# Patient Record
Sex: MALE | Race: WHITE | NOT HISPANIC OR LATINO | Employment: OTHER | RURAL
[De-identification: names, ages, dates, MRNs, and addresses within clinical notes are randomized per-mention and may not be internally consistent; named-entity substitution may affect disease eponyms.]

---

## 2024-03-03 ENCOUNTER — HOSPITAL ENCOUNTER (EMERGENCY)
Facility: HOSPITAL | Age: 59
Discharge: HOME | End: 2024-03-03
Payer: COMMERCIAL

## 2024-03-03 VITALS
OXYGEN SATURATION: 96 % | RESPIRATION RATE: 18 BRPM | SYSTOLIC BLOOD PRESSURE: 156 MMHG | BODY MASS INDEX: 45.13 KG/M2 | HEART RATE: 53 BPM | TEMPERATURE: 98 F | DIASTOLIC BLOOD PRESSURE: 67 MMHG | HEIGHT: 68 IN | WEIGHT: 297.81 LBS

## 2024-03-03 DIAGNOSIS — G51.0 BELL'S PALSY: Primary | ICD-10-CM

## 2024-03-03 LAB — GLUCOSE SERPL-MCNC: 117 MG/DL (ref 70–105)

## 2024-03-03 PROCEDURE — 99283 EMERGENCY DEPT VISIT LOW MDM: CPT

## 2024-03-03 PROCEDURE — 82962 GLUCOSE BLOOD TEST: CPT

## 2024-03-03 PROCEDURE — 99283 EMERGENCY DEPT VISIT LOW MDM: CPT | Mod: ,,, | Performed by: STUDENT IN AN ORGANIZED HEALTH CARE EDUCATION/TRAINING PROGRAM

## 2024-03-03 RX ORDER — ALLOPURINOL 300 MG/1
300 TABLET ORAL
COMMUNITY
Start: 2023-12-30

## 2024-03-03 RX ORDER — ASPIRIN 81 MG/1
81 TABLET ORAL DAILY
Status: ON HOLD | COMMUNITY
End: 2024-05-29 | Stop reason: HOSPADM

## 2024-03-03 RX ORDER — GEMFIBROZIL 600 MG/1
600 TABLET, FILM COATED ORAL
COMMUNITY
Start: 2023-12-30

## 2024-03-03 RX ORDER — LOSARTAN POTASSIUM 25 MG/1
25 TABLET ORAL
COMMUNITY
Start: 2023-12-30

## 2024-03-03 RX ORDER — ACYCLOVIR 400 MG/1
400 TABLET ORAL
Qty: 35 TABLET | Refills: 0 | Status: ON HOLD | OUTPATIENT
Start: 2024-03-03 | End: 2024-05-28

## 2024-03-03 RX ORDER — MELOXICAM 15 MG/1
15 TABLET ORAL
Status: ON HOLD | COMMUNITY
Start: 2023-12-30 | End: 2024-05-28

## 2024-03-03 RX ORDER — METFORMIN HYDROCHLORIDE 500 MG/1
500 TABLET ORAL
COMMUNITY
Start: 2023-12-30

## 2024-03-04 NOTE — ED TRIAGE NOTES
C/o left side facial drooping starting about 1hr pta-without other deficits noted-moves all extremeties well with good equal

## 2024-03-04 NOTE — ED PROVIDER NOTES
Encounter Date: 3/3/2024       History     Chief Complaint   Patient presents with    Facial Droop     C/o left side droop     Mr. Francis presents with left-sided facial droop for about 1 hour.  He has no speech deficits, no other neurologic deficits.  He has had chickenpox previously.  He is unable to raise or lower his left eyebrow.  He is familiar with Bell's palsy, as his mother has had multiple episodes of this.        Review of patient's allergies indicates:  No Known Allergies  Past Medical History:   Diagnosis Date    Diabetes mellitus      History reviewed. No pertinent surgical history.  History reviewed. No pertinent family history.  Social History     Tobacco Use    Smoking status: Never    Smokeless tobacco: Current     Types: Snuff   Substance Use Topics    Alcohol use: Never    Drug use: Never     Review of Systems   All other systems reviewed and are negative.      Physical Exam     Initial Vitals [03/03/24 1805]   BP Pulse Resp Temp SpO2   (!) 169/63 (!) 55 18 98.1 °F (36.7 °C) 96 %      MAP       --         Physical Exam    Nursing note and vitals reviewed.  Constitutional: Vital signs are normal. He appears well-developed and well-nourished.   HENT:   Head: Normocephalic and atraumatic.   Eyes: Conjunctivae and EOM are normal. Pupils are equal, round, and reactive to light.   Neck: Trachea normal. Neck supple. Carotid bruit is not present.   Normal range of motion.  Cardiovascular:  Normal rate, regular rhythm, normal heart sounds and normal pulses.           Pulses:       Radial pulses are 2+ on the right side and 2+ on the left side.        Femoral pulses are 2+ on the right side and 2+ on the left side.       Dorsalis pedis pulses are 2+ on the right side and 2+ on the left side.        Posterior tibial pulses are 2+ on the right side and 2+ on the left side.   Musculoskeletal:      Cervical back: Normal range of motion and neck supple.     Lymphadenopathy:     He has no cervical adenopathy.  No inguinal adenopathy noted on the right or left side.   Neurological: He is alert and oriented to person, place, and time. He has normal strength. He displays no atrophy and no tremor. No sensory deficit. He exhibits normal muscle tone. He displays no seizure activity. Coordination and gait normal. GCS eye subscore is 4. GCS verbal subscore is 5. GCS motor subscore is 6.   Left facial droop, asymmetrical smile with no movement of the border of the left aspect of his mouth.  He is unable to raise or lower his left eyelid.  Unable to squeeze his eyes tightly shut.   Skin: Skin is warm, dry and intact. Capillary refill takes less than 2 seconds. No cyanosis. Nails show no clubbing.   Psychiatric: He has a normal mood and affect. His speech is normal and behavior is normal.         Medical Screening Exam   See Full Note    ED Course   Procedures  Labs Reviewed   POCT GLUCOSE MONITORING CONTINUOUS - Abnormal; Notable for the following components:       Result Value    POC Glucose 117 (*)     All other components within normal limits          Imaging Results    None          Medications - No data to display  Medical Decision Making                                    Clinical Impression:   Final diagnoses:  [G51.0] Bell's palsy (Primary)        ED Disposition Condition    Discharge Stable          ED Prescriptions       Medication Sig Dispense Start Date End Date Auth. Provider    acyclovir (ZOVIRAX) 400 MG tablet Take 1 tablet (400 mg total) by mouth 5 (five) times daily. for 7 days 35 tablet 3/3/2024 3/10/2024 Gomez Phelps MD          Follow-up Information       Follow up With Specialties Details Why Contact Info    Fernandez Lima, DO Family Medicine In 3 days If symptoms worsen, As needed [id number] [address]  [facility]IC[facility] [id number] [phone]               Gomez Phelps MD  03/03/24 2104

## 2024-03-07 NOTE — ADDENDUM NOTE
Encounter addended by: Ashley Solano on: 3/7/2024 3:26 PM   Actions taken: SmartForm saved, Flowsheet accepted, Charge Capture section accepted

## 2024-04-08 ENCOUNTER — HOSPITAL ENCOUNTER (OUTPATIENT)
Dept: RADIOLOGY | Facility: HOSPITAL | Age: 59
Discharge: HOME | End: 2024-04-08
Attending: NURSE PRACTITIONER
Payer: COMMERCIAL

## 2024-04-08 DIAGNOSIS — M25.561 CHRONIC PAIN OF BOTH KNEES: ICD-10-CM

## 2024-04-08 DIAGNOSIS — M25.562 CHRONIC PAIN OF BOTH KNEES: ICD-10-CM

## 2024-04-08 DIAGNOSIS — G89.29 CHRONIC PAIN OF BOTH KNEES: ICD-10-CM

## 2024-04-08 PROCEDURE — 73560 X-RAY EXAM OF KNEE 1 OR 2: CPT | Mod: TC,50

## 2024-04-23 ENCOUNTER — HOSPITAL ENCOUNTER (OUTPATIENT)
Dept: RADIOLOGY | Facility: HOSPITAL | Age: 59
Discharge: HOME | End: 2024-04-23
Attending: NURSE PRACTITIONER
Payer: COMMERCIAL

## 2024-04-23 DIAGNOSIS — S83.241A ACUTE MEDIAL MENISCUS TEAR OF RIGHT KNEE: ICD-10-CM

## 2024-04-23 PROCEDURE — 73721 MRI JNT OF LWR EXTRE W/O DYE: CPT | Mod: TC,RT

## 2024-05-06 ENCOUNTER — OFFICE VISIT (OUTPATIENT)
Dept: ORTHOPEDICS | Facility: CLINIC | Age: 59
End: 2024-05-06
Payer: COMMERCIAL

## 2024-05-06 VITALS — BODY MASS INDEX: 46.74 KG/M2 | WEIGHT: 297.81 LBS | HEIGHT: 67 IN

## 2024-05-06 DIAGNOSIS — Z01.810 PRE-PROCEDURAL CARDIOVASCULAR EXAMINATION: ICD-10-CM

## 2024-05-06 DIAGNOSIS — M17.11 PRIMARY OSTEOARTHRITIS OF RIGHT KNEE: Primary | ICD-10-CM

## 2024-05-06 DIAGNOSIS — M17.12 PRIMARY OSTEOARTHRITIS OF LEFT KNEE: ICD-10-CM

## 2024-05-06 DIAGNOSIS — Z01.818 PRE-PROCEDURAL EXAMINATION: ICD-10-CM

## 2024-05-06 DIAGNOSIS — Z01.811 PRE-PROCEDURAL RESPIRATORY EXAMINATION: ICD-10-CM

## 2024-05-06 PROCEDURE — 99215 OFFICE O/P EST HI 40 MIN: CPT | Mod: PBBFAC | Performed by: ORTHOPAEDIC SURGERY

## 2024-05-06 PROCEDURE — 1160F RVW MEDS BY RX/DR IN RCRD: CPT | Mod: CPTII,,, | Performed by: ORTHOPAEDIC SURGERY

## 2024-05-06 PROCEDURE — 4010F ACE/ARB THERAPY RXD/TAKEN: CPT | Mod: CPTII,,, | Performed by: ORTHOPAEDIC SURGERY

## 2024-05-06 PROCEDURE — 99204 OFFICE O/P NEW MOD 45 MIN: CPT | Mod: S$PBB,,, | Performed by: ORTHOPAEDIC SURGERY

## 2024-05-06 PROCEDURE — 1159F MED LIST DOCD IN RCRD: CPT | Mod: CPTII,,, | Performed by: ORTHOPAEDIC SURGERY

## 2024-05-06 PROCEDURE — 3008F BODY MASS INDEX DOCD: CPT | Mod: CPTII,,, | Performed by: ORTHOPAEDIC SURGERY

## 2024-05-06 RX ORDER — ATORVASTATIN CALCIUM 10 MG/1
10 TABLET, FILM COATED ORAL
COMMUNITY
Start: 2024-03-06

## 2024-05-06 NOTE — PATIENT INSTRUCTIONS
Your surgery is scheduled for Tuesday, May 22 at [facility] in [place].    Labwork (1st floor clinic) ___x___  EKG      (2nd floor clinic)___x___  Chest X-ray (1st floor clinic)____x__    Clearance: Dr Coto, 5/22 at 10:40    Our office will contact you the day before surgery with your arrival time.  Do not eat or drink anything after midnight the night before surgery (this includes gum, candy, chewing tobacco, etc).  Bring all medication in their original bottles.  Bathe with Hibiclens the night or morning before your surgery.  The morning of your surgery ONLY take blood pressure, heart, acid reflux, or thyroid (if you take a morning dose) medication.  Take these medications with a sip of water.   Be sure to have stopped your blood thinner medication at the appropriate time, as instructed.  Bring your C-Pap machine if you have one.  All jewelry, piercings, or false eyelashes MUST be removed prior to surgery.

## 2024-05-06 NOTE — PROGRESS NOTES
CLINIC NOTE       Chief Complaint   Patient presents with    Right Knee - Pain        Basilio Francis is a 59 y.o. male seen today for evaluation of bilateral knee pain.  Symptoms began insidiously 25 years ago.  He had a right knee arthroscopy in 1999 and a left knee arthroscopy with Dr. Crews failed he has had progressive DJD over time he was treated in the past with periodic steroid injections.  He recently saw Rashida GARCIA.  Plain film x-rays of both knees were obtained 04/08/2024 an MRI examination of the right knee was obtained on 04/23/2024.  Both shows severe tricompartmental DJD of both knees.  He resides in [facility].  He was not on any blood thinners.  He was not see primary care physician routinely.  He was 5 ft 7 in tall and weighs 135.1 kg with a BMI of 46.65.  She was never had a heart attack or stroke.  He was not on any blood thinners.  He does have history of AODM type 2 and takes metformin    Past Medical History:   Diagnosis Date    Diabetes mellitus      No family history on file.  Current Outpatient Medications on File Prior to Visit   Medication Sig Dispense Refill    acyclovir (ZOVIRAX) 400 MG tablet Take 1 tablet (400 mg total) by mouth 5 (five) times daily. for 7 days 35 tablet 0    allopurinoL (ZYLOPRIM) 300 MG tablet Take 300 mg by mouth.      aspirin (ECOTRIN) 81 MG EC tablet Take 81 mg by mouth once daily.      gemfibroziL (LOPID) 600 MG tablet Take 600 mg by mouth.      losartan (COZAAR) 25 MG tablet Take 25 mg by mouth.      meloxicam (MOBIC) 15 MG tablet Take 15 mg by mouth.      metFORMIN (GLUCOPHAGE) 500 MG tablet Take 500 mg by mouth.       No current facility-administered medications on file prior to visit.       ROS     There were no vitals filed for this visit.    No past surgical history on file.     Review of patient's allergies indicates:  No Known Allergies     Ortho Exam :  Well-developed well-nourished  male no acute distress.  He was alert  oriented cooperative.  Neck is supple without JVD.  Breathing is regular nonlabored.  Skin is warm dry no lesions seen.  Exam of the right knee shows minimal intra-articular effusion.  There is mild genu varum.  Knee motion is 0-125 degrees of flexion.  Knee ligaments stable clinically.  There is medial joint crepitance appreciated with knee motion.    Radiographic Examination:    Technique:    Findings:    Impression:   See Above    Assessment and Plan  There are no problems to display for this patient.   Impression:  Severe tricompartmental DJD both knees right greater than left   Plan:  Patient was offered right total knee replacement arthroplasty.  Procedure was shown in detail using models and actual components.  Potential benefits risks surgery outlined to include but not limited to bleeding, infection, damage to blood vessels and nerves, need for further surgery, other risks and complications including even death the patient wished to proceed.  He will be set up for preoperative medical evaluation and clearance with Dr. Coto prior to surgery    David Norris M.D.

## 2024-05-21 ENCOUNTER — TELEPHONE (OUTPATIENT)
Dept: ORTHOPEDICS | Facility: CLINIC | Age: 59
End: 2024-05-21
Payer: COMMERCIAL

## 2024-05-21 NOTE — TELEPHONE ENCOUNTER
----- Message from Afia Clifton sent at 5/21/2024 11:00 AM CDT -----  Regarding: home health  Pt want to know if home health can come to his home, he said his ins will paid for it. Call back # [phone]

## 2024-05-22 ENCOUNTER — CLINICAL SUPPORT (OUTPATIENT)
Dept: CARDIOLOGY | Facility: CLINIC | Age: 59
End: 2024-05-22
Payer: COMMERCIAL

## 2024-05-22 ENCOUNTER — OFFICE VISIT (OUTPATIENT)
Dept: INTERNAL MEDICINE | Facility: CLINIC | Age: 59
End: 2024-05-22
Payer: COMMERCIAL

## 2024-05-22 ENCOUNTER — HOSPITAL ENCOUNTER (OUTPATIENT)
Dept: RADIOLOGY | Facility: HOSPITAL | Age: 59
Discharge: HOME | End: 2024-05-22
Attending: ORTHOPAEDIC SURGERY
Payer: COMMERCIAL

## 2024-05-22 VITALS
OXYGEN SATURATION: 96 % | WEIGHT: 299 LBS | BODY MASS INDEX: 46.93 KG/M2 | HEART RATE: 66 BPM | TEMPERATURE: 98 F | HEIGHT: 67 IN | SYSTOLIC BLOOD PRESSURE: 130 MMHG | DIASTOLIC BLOOD PRESSURE: 72 MMHG

## 2024-05-22 DIAGNOSIS — Z01.810 PRE-PROCEDURAL CARDIOVASCULAR EXAMINATION: ICD-10-CM

## 2024-05-22 DIAGNOSIS — E11.9 TYPE 2 DIABETES MELLITUS WITHOUT COMPLICATION, WITHOUT LONG-TERM CURRENT USE OF INSULIN: ICD-10-CM

## 2024-05-22 DIAGNOSIS — M17.12 PRIMARY OSTEOARTHRITIS OF LEFT KNEE: ICD-10-CM

## 2024-05-22 DIAGNOSIS — I10 ESSENTIAL HYPERTENSION: ICD-10-CM

## 2024-05-22 DIAGNOSIS — E66.01 CLASS 3 SEVERE OBESITY WITH BODY MASS INDEX (BMI) OF 45.0 TO 49.9 IN ADULT, UNSPECIFIED OBESITY TYPE, UNSPECIFIED WHETHER SERIOUS COMORBIDITY PRESENT: ICD-10-CM

## 2024-05-22 DIAGNOSIS — Z01.811 PRE-PROCEDURAL RESPIRATORY EXAMINATION: ICD-10-CM

## 2024-05-22 DIAGNOSIS — E78.5 DYSLIPIDEMIA: ICD-10-CM

## 2024-05-22 DIAGNOSIS — Z01.818 PREOP EXAMINATION: Primary | ICD-10-CM

## 2024-05-22 PROBLEM — E66.813 CLASS 3 SEVERE OBESITY WITH BODY MASS INDEX (BMI) OF 45.0 TO 49.9 IN ADULT: Status: ACTIVE | Noted: 2024-05-22

## 2024-05-22 PROCEDURE — 3008F BODY MASS INDEX DOCD: CPT | Mod: CPTII,,, | Performed by: INTERNAL MEDICINE

## 2024-05-22 PROCEDURE — 93005 ELECTROCARDIOGRAM TRACING: CPT | Mod: PBBFAC | Performed by: INTERNAL MEDICINE

## 2024-05-22 PROCEDURE — 1159F MED LIST DOCD IN RCRD: CPT | Mod: CPTII,,, | Performed by: INTERNAL MEDICINE

## 2024-05-22 PROCEDURE — 93010 ELECTROCARDIOGRAM REPORT: CPT | Mod: S$PBB,,, | Performed by: INTERNAL MEDICINE

## 2024-05-22 PROCEDURE — 99204 OFFICE O/P NEW MOD 45 MIN: CPT | Mod: S$PBB,,, | Performed by: INTERNAL MEDICINE

## 2024-05-22 PROCEDURE — 99212 OFFICE O/P EST SF 10 MIN: CPT | Mod: PBBFAC,25

## 2024-05-22 PROCEDURE — 3075F SYST BP GE 130 - 139MM HG: CPT | Mod: CPTII,,, | Performed by: INTERNAL MEDICINE

## 2024-05-22 PROCEDURE — 71046 X-RAY EXAM CHEST 2 VIEWS: CPT | Mod: TC

## 2024-05-22 PROCEDURE — 99214 OFFICE O/P EST MOD 30 MIN: CPT | Mod: PBBFAC,25 | Performed by: INTERNAL MEDICINE

## 2024-05-22 PROCEDURE — 4010F ACE/ARB THERAPY RXD/TAKEN: CPT | Mod: CPTII,,, | Performed by: INTERNAL MEDICINE

## 2024-05-22 PROCEDURE — 85730 THROMBOPLASTIN TIME PARTIAL: CPT | Performed by: ORTHOPAEDIC SURGERY

## 2024-05-22 PROCEDURE — 85610 PROTHROMBIN TIME: CPT | Performed by: ORTHOPAEDIC SURGERY

## 2024-05-22 PROCEDURE — 71046 X-RAY EXAM CHEST 2 VIEWS: CPT | Mod: 26,,, | Performed by: RADIOLOGY

## 2024-05-22 PROCEDURE — 3078F DIAST BP <80 MM HG: CPT | Mod: CPTII,,, | Performed by: INTERNAL MEDICINE

## 2024-05-22 NOTE — PROGRESS NOTES
Subjective     Patient ID: Basilio Francis is a 59 y.o. male.    Chief Complaint: Documentation Only (Right knee replacement)    Mr. Francis is a 59-year-old male the presents today for surgical preop risk stratification.  He is scheduled to have right total knee replacement on May 28th.  He has had some scopes in the past and did well with anesthesia.  His past medical history osteoarthritis, gout, dyslipidemia, hypertension, and diabetes mellitus type 2.  No known history of coronary artery disease, cerebrovascular disease, CHF, or valvular heart disease.  He denies any chest pain at rest or with exertion.  No known history of obstructive sleep apnea.  He states that his blood sugars are typically well controlled.  He is only on metformin currently.  Blood pressure looks good.  It is 130/72.  He is resting comfortably today in no distress.  He is afebrile and vital signs are stable.      Review of Systems   Constitutional:  Negative for appetite change, chills and fever.   HENT:  Negative for ear pain, hearing loss, sinus pressure/congestion and sore throat.    Eyes:  Negative for pain, redness and visual disturbance.   Respiratory:  Negative for apnea, cough, shortness of breath and wheezing.    Cardiovascular:  Negative for chest pain, palpitations and leg swelling.   Gastrointestinal:  Negative for abdominal pain, blood in stool, constipation, diarrhea and nausea.   Endocrine: Negative for cold intolerance, heat intolerance and polyuria.   Genitourinary:  Negative for dysuria and hematuria.   Musculoskeletal:  Positive for arthralgias, gait problem and leg pain. Negative for back pain, joint swelling, myalgias and neck pain.   Integumentary:  Negative for pallor and rash.   Allergic/Immunologic: Negative for frequent infections.   Neurological:  Negative for tremors, seizures, weakness and headaches.   Hematological:  Negative for adenopathy.   Psychiatric/Behavioral:  Negative for confusion, dysphoric mood  and sleep disturbance. The patient is not nervous/anxious.           Objective     Physical Exam  Vitals and nursing note reviewed.   Constitutional:       General: He is not in acute distress.     Appearance: Normal appearance. He is obese. He is not ill-appearing.   HENT:      Head: Normocephalic and atraumatic.      Right Ear: External ear normal.      Left Ear: External ear normal.      Nose: Nose normal.      Mouth/Throat:      Pharynx: Oropharynx is clear.   Eyes:      Extraocular Movements: Extraocular movements intact.      Conjunctiva/sclera: Conjunctivae normal.      Pupils: Pupils are equal, round, and reactive to light.   Neck:      Vascular: No carotid bruit.   Cardiovascular:      Rate and Rhythm: Normal rate and regular rhythm.      Pulses: Normal pulses.      Heart sounds: Normal heart sounds. No murmur heard.  Pulmonary:      Effort: No respiratory distress.      Breath sounds: Normal breath sounds. No wheezing or rales.   Abdominal:      General: Bowel sounds are normal.      Palpations: Abdomen is soft.   Musculoskeletal:         General: Tenderness present.      Cervical back: Normal range of motion and neck supple.      Right lower leg: No edema.      Left lower leg: No edema.   Skin:     General: Skin is warm and dry.      Capillary Refill: Capillary refill takes less than 2 seconds.      Coloration: Skin is not pale.   Neurological:      General: No focal deficit present.      Mental Status: He is alert and oriented to person, place, and time.      Cranial Nerves: No cranial nerve deficit.      Sensory: No sensory deficit.      Motor: No weakness.      Gait: Gait abnormal.   Psychiatric:         Mood and Affect: Mood normal.         Judgment: Judgment normal.            Assessment and Plan     1. Preop examination    2. Primary osteoarthritis of left knee    3. Essential hypertension    4. Dyslipidemia    5. Type 2 diabetes mellitus without complication, without long-term current use of  insulin    6. Class 3 severe obesity with body mass index (BMI) of 45.0 to 49.9 in adult, unspecified obesity type, unspecified whether serious comorbidity present        1. Knee osteoarthritis-plan for total knee replacement.  No known history of coronary artery disease, cerebrovascular disease, CHF, or valvular heart disease.  He denies any chest pain at rest or with exertion.  Revised cardiac risk index is class 1.  ACS-SRC risk is 4.6% for any complication and 2.8% for serious complication.  We have reviewed available lab work and imaging.  Overall considered low risk for this procedure.  Okay to proceed to surgery without any further testing at this time.    2. Essential hypertension-blood pressure 130/72.  He is on losartan 25 mg daily.  I have instructed him to continue this medication perioperatively    3. Diabetes mellitus type 2-on oral meds.  Currently just takes metformin.  He will need to hold that prior to surgery.  Can cover with sliding scale if needed perioperatively.  We will follow-up blood glucose when lab returns.    4. Dyslipidemia-stable on 10 mg of atorvastatin    5. Obesity-increases risk for morbidity.    Billing for this encounter based on moderate level of medical decision-making         No follow-ups on file.

## 2024-05-25 LAB
OHS QRS DURATION: 82 MS
OHS QTC CALCULATION: 428 MS

## 2024-05-28 ENCOUNTER — ANESTHESIA (OUTPATIENT)
Dept: SURGERY | Facility: HOSPITAL | Age: 59
End: 2024-05-28
Payer: COMMERCIAL

## 2024-05-28 ENCOUNTER — ANESTHESIA EVENT (OUTPATIENT)
Dept: SURGERY | Facility: HOSPITAL | Age: 59
End: 2024-05-28
Payer: COMMERCIAL

## 2024-05-28 ENCOUNTER — HOSPITAL ENCOUNTER (OUTPATIENT)
Facility: HOSPITAL | Age: 59
Discharge: HOME HEALTH | End: 2024-05-29
Attending: ORTHOPAEDIC SURGERY | Admitting: ORTHOPAEDIC SURGERY
Payer: COMMERCIAL

## 2024-05-28 DIAGNOSIS — M17.11 PRIMARY OSTEOARTHRITIS OF RIGHT KNEE: ICD-10-CM

## 2024-05-28 DIAGNOSIS — I10 ESSENTIAL (PRIMARY) HYPERTENSION: Primary | ICD-10-CM

## 2024-05-28 DIAGNOSIS — E78.2 MIXED HYPERLIPIDEMIA: ICD-10-CM

## 2024-05-28 DIAGNOSIS — E66.01 CLASS 3 SEVERE OBESITY DUE TO EXCESS CALORIES WITH BODY MASS INDEX (BMI) OF 45.0 TO 49.9 IN ADULT, UNSPECIFIED WHETHER SERIOUS COMORBIDITY PRESENT: ICD-10-CM

## 2024-05-28 DIAGNOSIS — M10.9 GOUT, ARTHRITIS: ICD-10-CM

## 2024-05-28 LAB
BASOPHILS # BLD AUTO: 0.02 K/UL (ref 0–0.2)
BASOPHILS NFR BLD AUTO: 0.6 % (ref 0–1)
DIFFERENTIAL METHOD BLD: ABNORMAL
EOSINOPHIL # BLD AUTO: 0.02 K/UL (ref 0–0.5)
EOSINOPHIL NFR BLD AUTO: 0.6 % (ref 1–4)
ERYTHROCYTE [DISTWIDTH] IN BLOOD BY AUTOMATED COUNT: 13.5 % (ref 11.5–14.5)
EST. AVERAGE GLUCOSE BLD GHB EST-MCNC: 97 MG/DL
GLUCOSE SERPL-MCNC: 112 MG/DL (ref 70–105)
GLUCOSE SERPL-MCNC: 132 MG/DL (ref 70–105)
GLUCOSE SERPL-MCNC: 166 MG/DL (ref 70–105)
HBA1C MFR BLD HPLC: 5 % (ref 4.5–6.6)
HCT VFR BLD AUTO: 38.4 % (ref 40–54)
HGB BLD-MCNC: 13 G/DL (ref 13.5–18)
IMM GRANULOCYTES # BLD AUTO: 0.01 K/UL (ref 0–0.04)
IMM GRANULOCYTES NFR BLD: 0.3 % (ref 0–0.4)
INDIRECT COOMBS: NORMAL
LYMPHOCYTES # BLD AUTO: 0.71 K/UL (ref 1–4.8)
LYMPHOCYTES NFR BLD AUTO: 21.2 % (ref 27–41)
MCH RBC QN AUTO: 30.6 PG (ref 27–31)
MCHC RBC AUTO-ENTMCNC: 33.9 G/DL (ref 32–36)
MCV RBC AUTO: 90.4 FL (ref 80–96)
MONOCYTES # BLD AUTO: 0.1 K/UL (ref 0–0.8)
MONOCYTES NFR BLD AUTO: 3 % (ref 2–6)
MPC BLD CALC-MCNC: 10.6 FL (ref 9.4–12.4)
NEUTROPHILS # BLD AUTO: 2.49 K/UL (ref 1.8–7.7)
NEUTROPHILS NFR BLD AUTO: 74.3 % (ref 53–65)
NRBC # BLD AUTO: 0 X10E3/UL
NRBC, AUTO (.00): 0 %
PLATELET # BLD AUTO: 91 K/UL (ref 150–400)
RBC # BLD AUTO: 4.25 M/UL (ref 4.6–6.2)
RH BLD: NORMAL
SPECIMEN OUTDATE: NORMAL
WBC # BLD AUTO: 3.35 K/UL (ref 4.5–11)

## 2024-05-28 PROCEDURE — 99214 OFFICE O/P EST MOD 30 MIN: CPT | Mod: GC,,, | Performed by: HOSPITALIST

## 2024-05-28 PROCEDURE — 20985 CPTR-ASST DIR MS PX: CPT | Mod: ,,, | Performed by: ORTHOPAEDIC SURGERY

## 2024-05-28 PROCEDURE — 63600175 PHARM REV CODE 636 W HCPCS: Performed by: ORTHOPAEDIC SURGERY

## 2024-05-28 PROCEDURE — 27000177 HC AIRWAY, LARYNGEAL MASK: Performed by: ANESTHESIOLOGY

## 2024-05-28 PROCEDURE — 27447 TOTAL KNEE ARTHROPLASTY: CPT | Mod: RT,,, | Performed by: ORTHOPAEDIC SURGERY

## 2024-05-28 PROCEDURE — 63600175 PHARM REV CODE 636 W HCPCS

## 2024-05-28 PROCEDURE — 27201960 HC SPINAL TRAY: Performed by: ANESTHESIOLOGY

## 2024-05-28 PROCEDURE — 99900035 HC TECH TIME PER 15 MIN (STAT)

## 2024-05-28 PROCEDURE — 25000003 PHARM REV CODE 250: Performed by: ORTHOPAEDIC SURGERY

## 2024-05-28 PROCEDURE — 37000009 HC ANESTHESIA EA ADD 15 MINS: Performed by: ORTHOPAEDIC SURGERY

## 2024-05-28 PROCEDURE — 25000003 PHARM REV CODE 250: Performed by: NURSE ANESTHETIST, CERTIFIED REGISTERED

## 2024-05-28 PROCEDURE — 36000713 HC OR TIME LEV V EA ADD 15 MIN: Performed by: ORTHOPAEDIC SURGERY

## 2024-05-28 PROCEDURE — 27000716 HC OXISENSOR PROBE, ANY SIZE: Performed by: ANESTHESIOLOGY

## 2024-05-28 PROCEDURE — 64447 NJX AA&/STRD FEMORAL NRV IMG: CPT | Mod: 59,RT,, | Performed by: ANESTHESIOLOGY

## 2024-05-28 PROCEDURE — 83036 HEMOGLOBIN GLYCOSYLATED A1C: CPT

## 2024-05-28 PROCEDURE — 85025 COMPLETE CBC W/AUTO DIFF WBC: CPT | Performed by: ORTHOPAEDIC SURGERY

## 2024-05-28 PROCEDURE — D9220A PRA ANESTHESIA: Mod: CRNA,,, | Performed by: NURSE ANESTHETIST, CERTIFIED REGISTERED

## 2024-05-28 PROCEDURE — 97165 OT EVAL LOW COMPLEX 30 MIN: CPT

## 2024-05-28 PROCEDURE — C1776 JOINT DEVICE (IMPLANTABLE): HCPCS | Performed by: ORTHOPAEDIC SURGERY

## 2024-05-28 PROCEDURE — 36000712 HC OR TIME LEV V 1ST 15 MIN: Performed by: ORTHOPAEDIC SURGERY

## 2024-05-28 PROCEDURE — 97161 PT EVAL LOW COMPLEX 20 MIN: CPT

## 2024-05-28 PROCEDURE — 25000003 PHARM REV CODE 250: Performed by: HOSPITALIST

## 2024-05-28 PROCEDURE — 94761 N-INVAS EAR/PLS OXIMETRY MLT: CPT

## 2024-05-28 PROCEDURE — C1713 ANCHOR/SCREW BN/BN,TIS/BN: HCPCS | Performed by: ORTHOPAEDIC SURGERY

## 2024-05-28 PROCEDURE — 63600175 PHARM REV CODE 636 W HCPCS: Performed by: NURSE ANESTHETIST, CERTIFIED REGISTERED

## 2024-05-28 PROCEDURE — 27201423 OPTIME MED/SURG SUP & DEVICES STERILE SUPPLY: Performed by: ORTHOPAEDIC SURGERY

## 2024-05-28 PROCEDURE — 71000033 HC RECOVERY, INTIAL HOUR: Performed by: ORTHOPAEDIC SURGERY

## 2024-05-28 PROCEDURE — D9220A PRA ANESTHESIA: Mod: ANES,,, | Performed by: ANESTHESIOLOGY

## 2024-05-28 PROCEDURE — 99900031 HC PATIENT EDUCATION (STAT)

## 2024-05-28 PROCEDURE — 86850 RBC ANTIBODY SCREEN: CPT | Performed by: ORTHOPAEDIC SURGERY

## 2024-05-28 PROCEDURE — 82962 GLUCOSE BLOOD TEST: CPT

## 2024-05-28 PROCEDURE — 36415 COLL VENOUS BLD VENIPUNCTURE: CPT | Performed by: ORTHOPAEDIC SURGERY

## 2024-05-28 PROCEDURE — 27000510 HC BLANKET BAIR HUGGER ANY SIZE: Performed by: ANESTHESIOLOGY

## 2024-05-28 PROCEDURE — 27000758 HC SPIROMETER

## 2024-05-28 PROCEDURE — 37000008 HC ANESTHESIA 1ST 15 MINUTES: Performed by: ORTHOPAEDIC SURGERY

## 2024-05-28 PROCEDURE — C1769 GUIDE WIRE: HCPCS | Performed by: ORTHOPAEDIC SURGERY

## 2024-05-28 PROCEDURE — 63600175 PHARM REV CODE 636 W HCPCS: Performed by: ANESTHESIOLOGY

## 2024-05-28 DEVICE — PATELLA
Type: IMPLANTABLE DEVICE | Site: KNEE | Status: FUNCTIONAL
Brand: TRIATHLON

## 2024-05-28 DEVICE — TIBIAL COMPONENT
Type: IMPLANTABLE DEVICE | Site: KNEE | Status: FUNCTIONAL
Brand: TRIATHLON

## 2024-05-28 DEVICE — CRUCIATE RETAINING FEMORAL
Type: IMPLANTABLE DEVICE | Site: KNEE | Status: FUNCTIONAL
Brand: TRIATHLON

## 2024-05-28 DEVICE — CEMENT BONE SPEED SET: Type: IMPLANTABLE DEVICE | Site: KNEE | Status: FUNCTIONAL

## 2024-05-28 DEVICE — TIBIAL BEARING INSERT - CS
Type: IMPLANTABLE DEVICE | Site: KNEE | Status: FUNCTIONAL
Brand: TRIATHLON

## 2024-05-28 RX ORDER — SODIUM CHLORIDE 9 MG/ML
INJECTION, SOLUTION INTRAVENOUS CONTINUOUS
Status: DISCONTINUED | OUTPATIENT
Start: 2024-05-28 | End: 2024-05-28

## 2024-05-28 RX ORDER — ONDANSETRON HYDROCHLORIDE 2 MG/ML
INJECTION, SOLUTION INTRAVENOUS
Status: DISCONTINUED | OUTPATIENT
Start: 2024-05-28 | End: 2024-05-28

## 2024-05-28 RX ORDER — BUPIVACAINE HYDROCHLORIDE 7.5 MG/ML
INJECTION, SOLUTION EPIDURAL; RETROBULBAR
Status: COMPLETED | OUTPATIENT
Start: 2024-05-28 | End: 2024-05-28

## 2024-05-28 RX ORDER — IBUPROFEN 200 MG
24 TABLET ORAL
Status: DISCONTINUED | OUTPATIENT
Start: 2024-05-28 | End: 2024-05-29 | Stop reason: HOSPADM

## 2024-05-28 RX ORDER — MEPERIDINE HYDROCHLORIDE 25 MG/ML
25 INJECTION INTRAMUSCULAR; INTRAVENOUS; SUBCUTANEOUS EVERY 10 MIN PRN
Status: DISCONTINUED | OUTPATIENT
Start: 2024-05-28 | End: 2024-05-28 | Stop reason: HOSPADM

## 2024-05-28 RX ORDER — TRANEXAMIC ACID 100 MG/ML
INJECTION, SOLUTION INTRAVENOUS
Status: DISCONTINUED | OUTPATIENT
Start: 2024-05-28 | End: 2024-05-28

## 2024-05-28 RX ORDER — SODIUM CHLORIDE, SODIUM LACTATE, POTASSIUM CHLORIDE, CALCIUM CHLORIDE 600; 310; 30; 20 MG/100ML; MG/100ML; MG/100ML; MG/100ML
INJECTION, SOLUTION INTRAVENOUS CONTINUOUS
Status: DISCONTINUED | OUTPATIENT
Start: 2024-05-28 | End: 2024-05-29

## 2024-05-28 RX ORDER — EPHEDRINE SULFATE 50 MG/ML
INJECTION, SOLUTION INTRAVENOUS
Status: DISCONTINUED | OUTPATIENT
Start: 2024-05-28 | End: 2024-05-28

## 2024-05-28 RX ORDER — PROPOFOL 10 MG/ML
VIAL (ML) INTRAVENOUS
Status: DISCONTINUED | OUTPATIENT
Start: 2024-05-28 | End: 2024-05-28

## 2024-05-28 RX ORDER — FENTANYL CITRATE 50 UG/ML
INJECTION, SOLUTION INTRAMUSCULAR; INTRAVENOUS
Status: DISCONTINUED | OUTPATIENT
Start: 2024-05-28 | End: 2024-05-28

## 2024-05-28 RX ORDER — HYDROMORPHONE HYDROCHLORIDE 2 MG/ML
0.5 INJECTION, SOLUTION INTRAMUSCULAR; INTRAVENOUS; SUBCUTANEOUS EVERY 5 MIN PRN
Status: DISCONTINUED | OUTPATIENT
Start: 2024-05-28 | End: 2024-05-28 | Stop reason: HOSPADM

## 2024-05-28 RX ORDER — GLUCAGON 1 MG
1 KIT INJECTION
Status: DISCONTINUED | OUTPATIENT
Start: 2024-05-28 | End: 2024-05-29 | Stop reason: HOSPADM

## 2024-05-28 RX ORDER — INSULIN ASPART 100 [IU]/ML
0-10 INJECTION, SOLUTION INTRAVENOUS; SUBCUTANEOUS
Status: DISCONTINUED | OUTPATIENT
Start: 2024-05-28 | End: 2024-05-29 | Stop reason: HOSPADM

## 2024-05-28 RX ORDER — ONDANSETRON HYDROCHLORIDE 2 MG/ML
4 INJECTION, SOLUTION INTRAVENOUS EVERY 8 HOURS PRN
Status: DISCONTINUED | OUTPATIENT
Start: 2024-05-28 | End: 2024-05-29 | Stop reason: HOSPADM

## 2024-05-28 RX ORDER — HYDROCODONE BITARTRATE AND ACETAMINOPHEN 10; 325 MG/1; MG/1
1 TABLET ORAL EVERY 4 HOURS PRN
Status: DISCONTINUED | OUTPATIENT
Start: 2024-05-28 | End: 2024-05-29 | Stop reason: HOSPADM

## 2024-05-28 RX ORDER — ATORVASTATIN CALCIUM 10 MG/1
10 TABLET, FILM COATED ORAL NIGHTLY
Status: DISCONTINUED | OUTPATIENT
Start: 2024-05-28 | End: 2024-05-29 | Stop reason: HOSPADM

## 2024-05-28 RX ORDER — IBUPROFEN 200 MG
16 TABLET ORAL
Status: DISCONTINUED | OUTPATIENT
Start: 2024-05-28 | End: 2024-05-29 | Stop reason: HOSPADM

## 2024-05-28 RX ORDER — MIDAZOLAM HYDROCHLORIDE 1 MG/ML
INJECTION INTRAMUSCULAR; INTRAVENOUS
Status: DISCONTINUED | OUTPATIENT
Start: 2024-05-28 | End: 2024-05-28

## 2024-05-28 RX ORDER — SODIUM CHLORIDE, SODIUM LACTATE, POTASSIUM CHLORIDE, CALCIUM CHLORIDE 600; 310; 30; 20 MG/100ML; MG/100ML; MG/100ML; MG/100ML
125 INJECTION, SOLUTION INTRAVENOUS CONTINUOUS
Status: DISCONTINUED | OUTPATIENT
Start: 2024-05-28 | End: 2024-05-28

## 2024-05-28 RX ORDER — KETOROLAC TROMETHAMINE 30 MG/ML
INJECTION, SOLUTION INTRAMUSCULAR; INTRAVENOUS
Status: DISCONTINUED | OUTPATIENT
Start: 2024-05-28 | End: 2024-05-28

## 2024-05-28 RX ORDER — LOSARTAN POTASSIUM 25 MG/1
25 TABLET ORAL DAILY
Status: DISCONTINUED | OUTPATIENT
Start: 2024-05-29 | End: 2024-05-29 | Stop reason: HOSPADM

## 2024-05-28 RX ORDER — MORPHINE SULFATE 10 MG/ML
4 INJECTION INTRAMUSCULAR; INTRAVENOUS; SUBCUTANEOUS EVERY 5 MIN PRN
Status: DISCONTINUED | OUTPATIENT
Start: 2024-05-28 | End: 2024-05-28 | Stop reason: HOSPADM

## 2024-05-28 RX ORDER — ONDANSETRON HYDROCHLORIDE 2 MG/ML
4 INJECTION, SOLUTION INTRAVENOUS DAILY PRN
Status: DISCONTINUED | OUTPATIENT
Start: 2024-05-28 | End: 2024-05-28 | Stop reason: HOSPADM

## 2024-05-28 RX ORDER — DIPHENHYDRAMINE HYDROCHLORIDE 50 MG/ML
25 INJECTION INTRAMUSCULAR; INTRAVENOUS EVERY 6 HOURS PRN
Status: DISCONTINUED | OUTPATIENT
Start: 2024-05-28 | End: 2024-05-28 | Stop reason: HOSPADM

## 2024-05-28 RX ORDER — LIDOCAINE HYDROCHLORIDE 20 MG/ML
INJECTION, SOLUTION EPIDURAL; INFILTRATION; INTRACAUDAL; PERINEURAL
Status: DISCONTINUED | OUTPATIENT
Start: 2024-05-28 | End: 2024-05-28

## 2024-05-28 RX ORDER — OXYCODONE HYDROCHLORIDE 5 MG/1
5 TABLET ORAL
Status: DISCONTINUED | OUTPATIENT
Start: 2024-05-28 | End: 2024-05-28 | Stop reason: HOSPADM

## 2024-05-28 RX ORDER — ALLOPURINOL 300 MG/1
300 TABLET ORAL DAILY
Status: DISCONTINUED | OUTPATIENT
Start: 2024-05-29 | End: 2024-05-29 | Stop reason: HOSPADM

## 2024-05-28 RX ORDER — MORPHINE SULFATE 4 MG/ML
4 INJECTION, SOLUTION INTRAMUSCULAR; INTRAVENOUS EVERY 4 HOURS PRN
Status: DISCONTINUED | OUTPATIENT
Start: 2024-05-28 | End: 2024-05-29 | Stop reason: HOSPADM

## 2024-05-28 RX ORDER — DEXAMETHASONE SODIUM PHOSPHATE 4 MG/ML
INJECTION, SOLUTION INTRA-ARTICULAR; INTRALESIONAL; INTRAMUSCULAR; INTRAVENOUS; SOFT TISSUE
Status: DISCONTINUED | OUTPATIENT
Start: 2024-05-28 | End: 2024-05-28

## 2024-05-28 RX ORDER — MELOXICAM 15 MG/1
15 TABLET ORAL DAILY
Status: ON HOLD | COMMUNITY
End: 2024-05-29 | Stop reason: HOSPADM

## 2024-05-28 RX ORDER — CEFAZOLIN SODIUM 1 G/3ML
INJECTION, POWDER, FOR SOLUTION INTRAMUSCULAR; INTRAVENOUS
Status: DISCONTINUED | OUTPATIENT
Start: 2024-05-28 | End: 2024-05-28

## 2024-05-28 RX ORDER — EPINEPHRINE 1 MG/ML
INJECTION, SOLUTION, CONCENTRATE INTRAVENOUS
Status: DISCONTINUED | OUTPATIENT
Start: 2024-05-28 | End: 2024-05-28

## 2024-05-28 RX ORDER — BISACODYL 10 MG/1
10 SUPPOSITORY RECTAL DAILY PRN
Status: DISCONTINUED | OUTPATIENT
Start: 2024-05-28 | End: 2024-05-29 | Stop reason: HOSPADM

## 2024-05-28 RX ORDER — ROPIVACAINE HYDROCHLORIDE 7.5 MG/ML
INJECTION, SOLUTION EPIDURAL; PERINEURAL
Status: COMPLETED | OUTPATIENT
Start: 2024-05-28 | End: 2024-05-28

## 2024-05-28 RX ADMIN — SODIUM CHLORIDE: 9 INJECTION, SOLUTION INTRAVENOUS at 07:05

## 2024-05-28 RX ADMIN — KETOROLAC TROMETHAMINE 60 MG: 30 INJECTION, SOLUTION INTRAMUSCULAR at 09:05

## 2024-05-28 RX ADMIN — ROPIVACAINE HYDROCHLORIDE 30 ML: 7.5 INJECTION, SOLUTION EPIDURAL; PERINEURAL at 09:05

## 2024-05-28 RX ADMIN — TRANEXAMIC ACID 1000 MG: 100 INJECTION, SOLUTION INTRAVENOUS at 09:05

## 2024-05-28 RX ADMIN — CEFAZOLIN 2 G: 2 INJECTION, POWDER, FOR SOLUTION INTRAMUSCULAR; INTRAVENOUS at 06:05

## 2024-05-28 RX ADMIN — ATORVASTATIN CALCIUM 10 MG: 10 TABLET, FILM COATED ORAL at 09:05

## 2024-05-28 RX ADMIN — HYDROCODONE BITARTRATE AND ACETAMINOPHEN 1 TABLET: 10; 325 TABLET ORAL at 09:05

## 2024-05-28 RX ADMIN — DEXAMETHASONE SODIUM PHOSPHATE 8 MG: 4 INJECTION, SOLUTION INTRA-ARTICULAR; INTRALESIONAL; INTRAMUSCULAR; INTRAVENOUS; SOFT TISSUE at 09:05

## 2024-05-28 RX ADMIN — VANCOMYCIN HYDROCHLORIDE 1000 MG: 1 INJECTION, POWDER, LYOPHILIZED, FOR SOLUTION INTRAVENOUS at 09:05

## 2024-05-28 RX ADMIN — SODIUM CHLORIDE, POTASSIUM CHLORIDE, SODIUM LACTATE AND CALCIUM CHLORIDE: 600; 310; 30; 20 INJECTION, SOLUTION INTRAVENOUS at 03:05

## 2024-05-28 RX ADMIN — EPHEDRINE SULFATE 25 MG: 50 INJECTION INTRAVENOUS at 08:05

## 2024-05-28 RX ADMIN — INSULIN ASPART 1 UNITS: 100 INJECTION, SOLUTION INTRAVENOUS; SUBCUTANEOUS at 09:05

## 2024-05-28 RX ADMIN — EPINEPHRINE 0.1 MCG: 1 INJECTION, SOLUTION, CONCENTRATE INTRAVENOUS at 08:05

## 2024-05-28 RX ADMIN — TRANEXAMIC ACID 1000 MG: 100 INJECTION, SOLUTION INTRAVENOUS at 10:05

## 2024-05-28 RX ADMIN — BUPIVACAINE HYDROCHLORIDE 1.5 ML: 7.5 INJECTION, SOLUTION EPIDURAL; RETROBULBAR at 08:05

## 2024-05-28 RX ADMIN — VANCOMYCIN HYDROCHLORIDE 1500 MG: 1 INJECTION, POWDER, LYOPHILIZED, FOR SOLUTION INTRAVENOUS at 09:05

## 2024-05-28 RX ADMIN — CEFAZOLIN 2 G: 1 INJECTION, POWDER, FOR SOLUTION INTRAMUSCULAR; INTRAVENOUS; PARENTERAL at 09:05

## 2024-05-28 RX ADMIN — ONDANSETRON 8 MG: 2 INJECTION INTRAMUSCULAR; INTRAVENOUS at 09:05

## 2024-05-28 RX ADMIN — EPHEDRINE SULFATE 25 MG: 50 INJECTION INTRAVENOUS at 09:05

## 2024-05-28 RX ADMIN — FENTANYL CITRATE 100 MCG: 50 INJECTION INTRAMUSCULAR; INTRAVENOUS at 08:05

## 2024-05-28 RX ADMIN — PROPOFOL 150 MG: 10 INJECTION, EMULSION INTRAVENOUS at 08:05

## 2024-05-28 RX ADMIN — LIDOCAINE HYDROCHLORIDE 60 MG: 20 INJECTION, SOLUTION INTRAVENOUS at 08:05

## 2024-05-28 RX ADMIN — MIDAZOLAM HYDROCHLORIDE 2 MG: 1 INJECTION, SOLUTION INTRAMUSCULAR; INTRAVENOUS at 08:05

## 2024-05-28 NOTE — ANESTHESIA PROCEDURE NOTES
Peripheral Block    Patient location during procedure: OR   Block not for primary anesthetic.  Reason for block: at surgeon's request and post-op pain management   Post-op Pain Location: RT KNEE PAIN, P OP   Start time: 5/28/2024 8:57 AM  Timeout: 5/28/2024 8:56 AM   End time: 5/28/2024 9:00 AM    Staffing  Authorizing Provider: Amilcar Melendez MD  Performing Provider: Amilcar Melendez MD    Staffing  Performed by: Amilcar Melendez MD  Authorized by: Amilcar Melendez MD    Preanesthetic Checklist  Completed: patient identified, IV checked, site marked, risks and benefits discussed, surgical consent, monitors and equipment checked, pre-op evaluation and timeout performed  Peripheral Block  Patient position: supine  Prep: ChloraPrep  Patient monitoring: heart rate, cardiac monitor, continuous pulse ox, continuous capnometry and frequent blood pressure checks  Block type: adductor canal  Laterality: right  Injection technique: single shot  Needle  Needle type: Tuohy   Needle gauge: 20 G  Needle length: 4 in  Needle localization: ultrasound guidance   -ultrasound image captured on disc.  Assessment  Injection assessment: negative aspiration, negative parasthesia and local visualized surrounding nerve  Paresthesia pain: none  Heart rate change: no  Slow fractionated injection: yes  Pain Tolerance: comfortable throughout block and no complaints  Medications:    Medications: ROPIvacaine (NAROPIN) injection 0.75% - Perineural   30 mL - 5/28/2024 9:00:00 AM

## 2024-05-28 NOTE — BRIEF OP NOTE
[facility] - Orthopedic Periop Services  Brief Operative Note    Surgery Date: 5/28/2024     Surgeons and Role:     * David Norris MD - Primary    Assisting Surgeon: None    Pre-op Diagnosis:  Primary osteoarthritis of right knee [M17.11]    Post-op Diagnosis:  Post-Op Diagnosis Codes:     * Primary osteoarthritis of right knee [M17.11]    Procedure(s) (LRB):  ARTHROPLASTY, KNEE, TOTAL, USING COMPUTER-ASSISTED NAVIGATION (Right)    Anesthesia: General    Description of the findings of the procedure(s): See Op Note     Estimated Blood Loss: 50 mL         Specimens:   Specimen (24h ago, onward)      None              Discharge Note    OUTCOME: Patient tolerated treatment/procedure well without complication and is now ready for discharge.    DISPOSITION: Home or Self Care    FINAL DIAGNOSIS:  Primary osteoarthritis of right knee    FOLLOWUP: In clinic    DISCHARGE INSTRUCTIONS:  No discharge procedures on file.     Warfarin refill approved per protocol. Prescription for 4mg tablets was eprescribed to [facility]'s pharmacy. Patient is compliant with INR blood tests as ordered. Last OV with Dr. Camacho 11/2/22. Current dose of Warfarin is 8mg W; 6mg all other days.

## 2024-05-28 NOTE — ANESTHESIA PREPROCEDURE EVALUATION
05/28/2024  Basilio Francis is a 59 y.o., male.      Pre-op Assessment    I have reviewed the Patient Summary Reports.     I have reviewed the Nursing Notes. I have reviewed the NPO Status.   I have reviewed the Medications.     Review of Systems  Anesthesia Hx:  No problems with previous Anesthesia                Social:  Non-Smoker, No Alcohol Use       Hematology/Oncology:  Hematology Normal   Oncology Normal                                   EENT/Dental:  EENT/Dental Normal           Cardiovascular:     Hypertension           hyperlipidemia                             Pulmonary:  Pulmonary Normal                       Renal/:  Renal/ Normal                 Hepatic/GI:  Hepatic/GI Normal                 Musculoskeletal:  Arthritis               Neurological:  Neurology Normal                                      Endocrine:  Diabetes, poorly controlled, type 2         Morbid Obesity / BMI > 40  Dermatological:  Skin Normal    Psych:  Psychiatric Normal                    Physical Exam  General: Well nourished    Airway:  Mallampati: III / III  Mouth Opening: Normal  TM Distance: > 6 cm  Tongue: Normal  Neck ROM: Normal ROM    Chest/Lungs:  Clear to auscultation, Normal Respiratory Rate    Heart:  Rate: Normal  Rhythm: Regular Rhythm        Anesthesia Plan  Type of Anesthesia, risks & benefits discussed:    Anesthesia Type: Gen Supraglottic Airway, Spinal, Regional  Intra-op Monitoring Plan: Standard ASA Monitors  Post Op Pain Control Plan: multimodal analgesia  Induction:  IV  Informed Consent: Informed consent signed with the Patient and all parties understand the risks and agree with anesthesia plan.  All questions answered. Patient consented to blood products? Yes  ASA Score: 3  Day of Surgery Review of History & Physical: H&P Update referred to the surgeon/provider.I have interviewed and examined  the patient. I have reviewed the patient's H&P dated: There are no significant changes.     Ready For Surgery From Anesthesia Perspective.     .

## 2024-05-28 NOTE — PLAN OF CARE
Problem: Occupational Therapy  Goal: Occupational Therapy Goal  Description: ST.Pt will perform bathing with Bright with setup at EOB  2.Pt will perform UE dressing with Shelby  3.Pt will perform LE dressing with Bright  4.Pt will transfer bed/chair/bsc with CGA with RW  5.Pt will perform standing task x 2 min with CGA with RW  6.Tolerate 15 min of tx without fatigue.      LTG:   Restore to max I with selfcare and mobility.      Outcome: Progressing

## 2024-05-28 NOTE — H&P
[facility] - Orthopedic Periop Services  Orthopedics  H&P    Patient Name: Basilio Francis  MRN: [id number]  Admission Date: 5/28/2024  Primary Care Provider: Fernandez Lima DO    Patient information was obtained from patient and ER records.     Subjective:     Principal Problem:Primary osteoarthritis of right knee    Chief Complaint: No chief complaint on file.       HPI: Chief: Right knee pain   History:   Basilio Francis is a 59 y.o. male seen today for evaluation of bilateral knee pain.  Symptoms began insidiously 25 years ago.  He had a right knee arthroscopy in 1999 and a left knee arthroscopy with Dr. Crews failed he has had progressive DJD over time he was treated in the past with periodic steroid injections.  He recently saw Rashida GARCIA.  Plain film x-rays of both knees were obtained 04/08/2024 an MRI examination of the right knee was obtained on 04/23/2024.  Both shows severe tricompartmental DJD of both knees.  He resides in [facility].  He was not on any blood thinners.  He was not see primary care physician routinely.  He was 5 ft 7 in tall and weighs 135.1 kg with a BMI of 46.65.  She was never had a heart attack or stroke.  He was not on any blood thinners.  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  Procedure was shown in detail using models and actual components.  Potential benefits and risks surgery outlined to include but not limited to bleeding, infection, damage to blood vessels and nerves, need further surgery, other risks and complications including even death the patient wished proceed.           Past Medical History:   Diagnosis Date    Diabetes mellitus        No past surgical history on file.    Review of patient's allergies indicates:  No Known Allergies    No current facility-administered medications for this encounter.     Current Outpatient Medications   Medication Sig     acyclovir (ZOVIRAX) 400 MG tablet Take 1 tablet (400 mg total) by mouth 5 (five) times daily. for 7 days (Patient not taking: Reported on 5/22/2024)    allopurinoL (ZYLOPRIM) 300 MG tablet Take 300 mg by mouth.    aspirin (ECOTRIN) 81 MG EC tablet Take 81 mg by mouth once daily.    atorvastatin (LIPITOR) 10 MG tablet Take 10 mg by mouth.    gemfibroziL (LOPID) 600 MG tablet Take 600 mg by mouth.    losartan (COZAAR) 25 MG tablet Take 25 mg by mouth.    meloxicam (MOBIC) 15 MG tablet Take 15 mg by mouth.    metFORMIN (GLUCOPHAGE) 500 MG tablet Take 500 mg by mouth.     Family History    None       Tobacco Use    Smoking status: Never    Smokeless tobacco: Current     Types: Snuff   Substance and Sexual Activity    Alcohol use: Never    Drug use: Never    Sexual activity: Not on file     Review of Systems   Constitutional: Negative.     Objective:     Vital Signs (Most Recent):    Vital Signs (24h Range):              There is no height or weight on file to calculate BMI.    No intake or output data in the 24 hours ending 05/27/24 2306     General    Vitals reviewed.  Constitutional: He is oriented to person, place, and time. He appears well-developed and well-nourished.   HENT:   Head: Normocephalic and atraumatic.   Eyes: EOM are normal. Pupils are equal, round, and reactive to light.   Neck: Neck supple.   Cardiovascular:  Normal rate, regular rhythm and normal heart sounds.            Pulmonary/Chest: Effort normal and breath sounds normal.   Abdominal: Soft. Bowel sounds are normal.   Neurological: He is alert and oriented to person, place, and time.   Psychiatric: He has a normal mood and affect. His behavior is normal.     General Musculoskeletal Exam   Gait: antalgic       Right Knee Exam     Inspection   Effusion: present    Tenderness   The patient is tender to palpation of the medial joint line.    Range of Motion   Extension:  abnormal   Flexion:  abnormal     Tests   Ligament Examination   Lachman: normal  (-1 to 2mm)   PCL-Posterior Drawer: normal (0 to 2mm)     MCL - Valgus: normal (0 to 2mm)  LCL - Varus: normal  Pivot Shift: normal (Equal)    Other   Sensation: normal  Apley Grind Test: positive    Left Knee Exam   Left knee exam is normal.    Vascular Exam     Right Pulses  Dorsalis Pedis:      2+  Posterior Tibial:      2+           Significant Labs: All pertinent labs within the past 24 hours have been reviewed.    Significant Imaging: I have reviewed all pertinent imaging results/findings.  Assessment/Plan:     No notes have been filed under this hospital service.  Service: Orthopedic Surgery      David Norris MD  Orthopedics  [facility] - Orthopedic Periop Services

## 2024-05-28 NOTE — NURSING
Pt to room via gurney pt noted awake alert resp slow easy and regular,IV infusing at 20 mlhr no ss inf noted at site. Right knee noted with ace wrap dressing intact with immobilizer in place, positive pedal pulses noted bilat, pt able to wiggle toes upon command.  SCD intact to left lower ext, family member at side of bed, side rails up call bell in easy reach pt instructed to notify nurses with needs prn they agreed. Hemovac drain noted intact and compressed,

## 2024-05-28 NOTE — PLAN OF CARE
Problem: Physical Therapy  Goal: Physical Therapy Goal  Description: Short Term Goals to be met by: 6/10/24    Patient will increase functional independence with mobility by performin. Supine to sit with Modified [place]  2. Sit to stand transfer with Modified [place]  3. Bed to chair transfer with Modified [place] using Rolling Walker, WBAT R LE  4. Gait  x 100 feet with Modified [place] using Rolling Walker, WBAT R LE.   5. Lower extremity exercise program x30 reps per handout, with assistance as needed  6. Knee ROM 0-90    Long Term Goals to be met by: 24    Pt will regain full independent functional mobility to return to prior activities of daily living.   Outcome: Progressing     PT eval completed. Please see eval note for details.

## 2024-05-28 NOTE — DISCHARGE INSTRUCTIONS
ANKLE: Pumps        Point toes down, then up. Repeat 30 times, 2 sessions per day        Quad Set        With other leg bent, foot flat, slowly tighten muscles on right thigh of straight leg while counting out loud to 5.  Repeat 30 times, 2 sessions per day.          Hip Abduction / Adduction: with Extended Knee (Supine)        Bring right leg out to side and return. Keep knee straight.  Repeat 30 times, 2 sessions per day.         HIP / KNEE: Flexion, Heel Slides - Supine        Slide right heel up toward buttocks, keeping leg in straight line. Repeat 30 times, 2 sessions per day.  Use towel or pillowcase under heel as needed.       Straight Leg Raise        Bend left leg. Raise right leg 8-12 inches with knee locked. Exhale and tighten thigh muscles while raising leg.   Repeat 30 times, 2 sessions per day.           KNEE: Extension, Long Arc Quads - Sitting        Raise right leg until knee is straight.  Repeat 30 times, 2 sessions per day        KNEE: Flexion / Extension - Sitting        Sit at edge of surface, foot on towel or pillowcase. Bend and straighten right knee.  Repeat 30 times, 2 sessions per day.   Use opposite leg to increase knee flexion.    *Keep dressing dry and intact, do not remove dressing, if dressing becomes wet or bloody notify home health staff.  Swingbed/Home Health will remove your drain (if you have one) on post op day #2 and change your dressing on post op day #3 and day #10, give them that special dressing we sent home with you.  *Continue incentive spirometry at least every 2 hours while awake.  *Continue white stockings remove 2 times a day for 1 hour and replace. Once dressing is changed, apply other stocking to surgery leg.   *Continue cool-jet to knee. Do not apply directly to skin.   *Take laxative of choice to have a bowel movement at least by tomorrow and then every other day.  *Increase fluids by mouth.  *Staples will be removed by home health/swingbed staff 2 weeks from  surgery prior to follow up appoinment.  *Elevate surgery leg on pillow at ankle. No pillow under knees.  *Notify swingbed/home health staff of any concerns.

## 2024-05-28 NOTE — PLAN OF CARE
JURGEN spoke with pt and he was not sure which hh he wanted to use. JURGEN called pts sister, no answer. JURGEN awaiting call back from sister. JURGEN following.     1400: Pts sister called back and she did not have the hh that pt and family wanted to set up for sure. Per pts sister, she will call SW back in a.m. with the hh. JURGEN following.

## 2024-05-28 NOTE — PT/OT/SLP EVAL
Physical Therapy Evaluation     Patient Name: Basilio Francis   MRN: [id number]  Recent Surgery: Procedure(s) (LRB):  ARTHROPLASTY, KNEE, TOTAL, USING COMPUTER-ASSISTED NAVIGATION (Right) Day of Surgery    Recommendations:     Discharge Recommendations: Low Intensity Therapy   Discharge Equipment Recommendations: none   Barriers to discharge: Increased level of assist, Decreased caregiver support, and Ongoing medical treatment    Assessment:     Basilio Francis is a 59 y.o. male admitted with a medical diagnosis of Primary osteoarthritis of right knee. He presents with the following impairments/functional limitations: weakness, impaired endurance, impaired functional mobility, gait instability, decreased lower extremity function, pain, decreased ROM, orthopedic precautions. Pt has undergone R TKA and plans to d/c home with family next door.     Rehab Prognosis: Good; patient would benefit from acute PT services to address these deficits and reach maximum level of function.    Plan:     During this hospitalization, patient to be seen BID (5x/wk; daily 2x/wk) to address the above listed problems via gait training, therapeutic activities, therapeutic exercises    Plan of Care Expires: 06/28/24    Subjective     Chief Complaint: R TKA  Patient Comments/Goals: agreeable   Pain/Comfort:  Pain Rating 1: 0/10    Social History:  Living Environment: Patient lives alone in a mobile home with ramped  Prior Level of Function: Prior to admission, patient was independent and driving  Equipment Used at Home: none  DME owned (not currently used):  crutches and rolling walker  Assistance Upon Discharge: family    Objective:     Communicated with RN prior to session. Patient found HOB elevated with blood pressure cuff, peripheral IV, knee immobilizer, pulse ox (continuous), hemovac, SCD upon PT entry to room.    General Precautions: Standard, fall   Orthopedic Precautions: RLE weight bearing as tolerated   Braces: Knee immobilizer     Respiratory Status: Room air    Exams:  RLE ROM: WFL except knee immobilized  RLE Strength: Deficits: 2+/5 knee n/t  LLE ROM: WFL  LLE Strength: WFL  Cognitive: Patient is oriented to Person, Place, Time, Situation  Sensation:    -       Intact    Functional Mobility:  Gait belt applied - Yes  Bed Mobility  Supine to Sit: minimum assistance for LE management and trunk management  Transfers  Sit to Stand: minimum assistance with rolling walker and with cues for hand placement, foot placement, and weight bearing precautions  Gait  Patient ambulated 10ft with rolling walker and contact guard assistance. Patient required cues for heel strike, position in walker, sequencing, step through gait pattern, stride length, upright posture, and weight bearing status to increase independence and safety. Patient required cues ~ 50% of the time.  Balance  Sitting: FAIR+: Maintains balance through MINIMAL excursions of active trunk motion  Standing: FAIR: Needs CONTACT GUARD during gait      Therapeutic Activities and Exercises:  Patient educated on role of acute care PT and PT POC, safety while in hospital including calling nurse for mobility, and call light usage.  Educated about weightbearing precautions and provided cuing for adherence as appropriate during session.  Patient performed 1 set(s) of 5-10 repetitions of the following bed level exercises: ankle pumps and quad sets for bilateral LE. Patient required skilled PT for instruction of exercises and appropriate cues to perform exercises safely and appropriately.  Educated about importance of OOB mobility and remaining up in chair most of the day.      AM-PAC 6 CLICK MOBILITY  Total Score:17    Patient left HOB elevated with all lines intact, call button in reach, RN notified, and family present.    GOALS:   Multidisciplinary Problems       Physical Therapy Goals          Problem: Physical Therapy    Goal Priority Disciplines Outcome Goal Variances Interventions   Physical  Therapy Goal     PT, PT/OT Progressing     Description: Short Term Goals to be met by: 6/10/24    Patient will increase functional independence with mobility by performin. Supine to sit with Modified [place]  2. Sit to stand transfer with Modified [place]  3. Bed to chair transfer with Modified [place] using Rolling Walker, WBAT R LE  4. Gait  x 100 feet with Modified [place] using Rolling Walker, WBAT R LE.   5. Lower extremity exercise program x30 reps per handout, with assistance as needed  6. Knee ROM 0-90    Long Term Goals to be met by: 24    Pt will regain full independent functional mobility to return to prior activities of daily living.                        History:     Past Medical History:   Diagnosis Date    Diabetes mellitus     Essential (primary) hypertension        History reviewed. No pertinent surgical history.    Time Tracking:     PT Received On: 24  PT Start Time: 1359  PT Stop Time: 1415  PT Total Time (min): 16 min     Billable Minutes: Evaluation low complexity    2024

## 2024-05-28 NOTE — PLAN OF CARE
[facility]  Initial Discharge Assessment       Primary Care Provider: Fernandez Lima DO    Admission Diagnosis: Primary osteoarthritis of right knee [M17.11]    Admission Date: 5/28/2024  Expected Discharge Date:     Transition of Care Barriers: None    Payor: [employer] / Plan: [facility] CHOICE PLUS / Product Type: Commercial /     Extended Emergency Contact Information  Primary Emergency Contact: Virginia James  Mobile Phone: [phone]  Relation: Sister  Preferred language: English   needed? No    Discharge Plan A: Home, Home Health  Discharge Plan B: Home, Home Health      [facility] [address] [place] [id number] [facility] [address]  [address] [facility] [address]  [facility] [address]  Phone: [phone] Fax: [phone]    [facility] [address] [place]  [address] [facility] [address]  Phone: [phone] Fax: [phone]      Initial Assessment (most recent)       Adult Discharge Assessment - 05/28/24 1411          Discharge Assessment    Assessment Type Discharge Planning Assessment     Source of Information family     People in Home alone     Do you expect to return to your current living situation? Yes     Do you have help at home or someone to help you manage your care at home? Yes     Who are your caregiver(s) and their phone number(s)? Virginia James- Sister [phone]     Prior to hospitilization cognitive status: Unable to Assess     Current cognitive status: Unable to Assess     Walking or Climbing Stairs Difficulty no     Dressing/Bathing Difficulty no     Home Accessibility wheelchair accessible     Equipment Currently Used at Home none     Readmission within 30 days? No     Patient currently being followed by outpatient case management? No     Do you currently have service(s) that help you manage your care at home? No     Do you take prescription medications? Yes     Do you have prescription coverage? Yes     Coverage [facility]     Do you have any  problems affording any of your prescribed medications? No     Is the patient taking medications as prescribed? yes     Who is going to help you get home at discharge? Sister     How do you get to doctors appointments? car, drives self     Are you on dialysis? No     Do you take coumadin? No     Discharge Plan A Home;Home Health     Discharge Plan B Home;Home Health     DME Needed Upon Discharge  walker, rolling     Discharge Plan discussed with: Sibling     Name(s) and Number(s) Virginia James- Sister     Transition of Care Barriers None        Physical Activity    On average, how many days per week do you engage in moderate to strenuous exercise (like a brisk walk)? 0 days     On average, how many minutes do you engage in exercise at this level? 0 min        Financial Resource Strain    How hard is it for you to pay for the very basics like food, housing, medical care, and heating? Not hard at all        Housing Stability    In the last 12 months, was there a time when you were not able to pay the mortgage or rent on time? No     At any time in the past 12 months, were you homeless or living in a shelter (including now)? No        Transportation Needs    Has the lack of transportation kept you from medical appointments, meetings, work or from getting things needed for daily living? No        Food Insecurity    Within the past 12 months, you worried that your food would run out before you got the money to buy more. Never true     Within the past 12 months, the food you bought just didn't last and you didn't have money to get more. Never true        Stress    Do you feel stress - tense, restless, nervous, or anxious, or unable to sleep at night because your mind is troubled all the time - these days? Not at all        Social Isolation    How often do you feel lonely or isolated from those around you?  Never        Alcohol Use    Q1: How often do you have a drink containing alcohol? Never     Q2: How many drinks  containing alcohol do you have on a typical day when you are drinking? Patient does not drink        Utilities    In the past 12 months has the electric, gas, oil, or water company threatened to shut off services in your home? No        Health Literacy    How often do you need to have someone help you when you read instructions, pamphlets, or other written material from your doctor or pharmacy? Never                   JURGEN spoke with pts sisterCici in room. Pt in sx at time of initial dcp assessment. Pt lives at home alone but pts sister is there to help when needed and pts niece lives next door. The plan is for pt to dc back home and have hh arranged with [facility] or [facility]. Pts sister to call family to find out which hh they want arranged for sure. Pt will need a rw. JURGEN faxed referral to [facility] and informed Reshma of referral and dc tomorrow. SDOH questions completed. JURGEN will cont to follow for dc needs.

## 2024-05-28 NOTE — ANESTHESIA POSTPROCEDURE EVALUATION
Anesthesia Post Evaluation    Patient: Basilio Francis    Procedure(s) Performed: Procedure(s) (LRB):  ARTHROPLASTY, KNEE, TOTAL, USING COMPUTER-ASSISTED NAVIGATION (Right)    Final Anesthesia Type: general      Patient location during evaluation: PACU  Patient participation: Yes- Able to Participate  Level of consciousness: awake and sedated  Post-procedure vital signs: reviewed and stable  Pain management: adequate  Airway patency: patent    PONV status at discharge: No PONV  Anesthetic complications: no      Cardiovascular status: blood pressure returned to baseline  Respiratory status: unassisted  Hydration status: euvolemic  Follow-up not needed.              Vitals Value Taken Time   /48 05/28/24 1144   Temp 36.7 °C (98 °F) 05/28/24 1106   Pulse 63 05/28/24 1144   Resp 16 05/28/24 1144   SpO2 96 % 05/28/24 1341   Vitals shown include unfiled device data.      Event Time   Out of Recovery 11:44:26         Pain/Padmini Score: Padmini Score: 10 (5/28/2024 11:44 AM)

## 2024-05-28 NOTE — SUBJECTIVE & OBJECTIVE
Past Medical History:   Diagnosis Date    Diabetes mellitus        No past surgical history on file.    Review of patient's allergies indicates:  No Known Allergies    No current facility-administered medications for this encounter.     Current Outpatient Medications   Medication Sig    acyclovir (ZOVIRAX) 400 MG tablet Take 1 tablet (400 mg total) by mouth 5 (five) times daily. for 7 days (Patient not taking: Reported on 5/22/2024)    allopurinoL (ZYLOPRIM) 300 MG tablet Take 300 mg by mouth.    aspirin (ECOTRIN) 81 MG EC tablet Take 81 mg by mouth once daily.    atorvastatin (LIPITOR) 10 MG tablet Take 10 mg by mouth.    gemfibroziL (LOPID) 600 MG tablet Take 600 mg by mouth.    losartan (COZAAR) 25 MG tablet Take 25 mg by mouth.    meloxicam (MOBIC) 15 MG tablet Take 15 mg by mouth.    metFORMIN (GLUCOPHAGE) 500 MG tablet Take 500 mg by mouth.     Family History    None       Tobacco Use    Smoking status: Never    Smokeless tobacco: Current     Types: Snuff   Substance and Sexual Activity    Alcohol use: Never    Drug use: Never    Sexual activity: Not on file     Review of Systems   Constitutional: Negative.     Objective:     Vital Signs (Most Recent):    Vital Signs (24h Range):              There is no height or weight on file to calculate BMI.    No intake or output data in the 24 hours ending 05/27/24 2306     General    Vitals reviewed.  Constitutional: He is oriented to person, place, and time. He appears well-developed and well-nourished.   HENT:   Head: Normocephalic and atraumatic.   Eyes: EOM are normal. Pupils are equal, round, and reactive to light.   Neck: Neck supple.   Cardiovascular:  Normal rate, regular rhythm and normal heart sounds.            Pulmonary/Chest: Effort normal and breath sounds normal.   Abdominal: Soft. Bowel sounds are normal.   Neurological: He is alert and oriented to person, place, and time.   Psychiatric: He has a normal mood and affect. His behavior is normal.      General Musculoskeletal Exam   Gait: antalgic       Right Knee Exam     Inspection   Effusion: present    Tenderness   The patient is tender to palpation of the medial joint line.    Range of Motion   Extension:  abnormal   Flexion:  abnormal     Tests   Ligament Examination   Lachman: normal (-1 to 2mm)   PCL-Posterior Drawer: normal (0 to 2mm)     MCL - Valgus: normal (0 to 2mm)  LCL - Varus: normal  Pivot Shift: normal (Equal)    Other   Sensation: normal  Apley Grind Test: positive    Left Knee Exam   Left knee exam is normal.    Vascular Exam     Right Pulses  Dorsalis Pedis:      2+  Posterior Tibial:      2+           Significant Labs: All pertinent labs within the past 24 hours have been reviewed.    Significant Imaging: I have reviewed all pertinent imaging results/findings.

## 2024-05-28 NOTE — ANESTHESIA PROCEDURE NOTES
Spinal    Diagnosis: rt TKA  Patient location during procedure: OR  Start time: 5/28/2024 8:50 AM  Timeout: 5/28/2024 8:49 AM  End time: 5/28/2024 8:53 AM    Staffing  Authorizing Provider: Amilcar Melendez MD  Performing Provider: Amilcar Melendez MD    Staffing  Performed by: Amilcar Melendez MD  Authorized by: Amilcar Melendez MD    Preanesthetic Checklist  Completed: patient identified, IV checked, risks and benefits discussed, surgical consent, monitors and equipment checked, pre-op evaluation and timeout performed  Spinal Block  Patient position: sitting  Prep: Betadine  Patient monitoring: heart rate, continuous pulse ox, continuous capnometry and frequent blood pressure checks  Approach: midline  Location: L3-4  Injection technique: single shot  CSF Fluid: clear free-flowing CSF  Needle  Needle type: Quincke   Needle gauge: 22 G  Needle length: 4 in  Needle localization: anatomical landmarks  Assessment  Sensory level: T8   Dermatomal levels determined by alcohol wipe  Ease of block: easy  Patient's tolerance of the procedure: comfortable throughout block and no complaints  Medications:    Medications: BUPivacaine (pf) (MARCAINE) injection 0.75% - Intraspinal   1.5 mL - 5/28/2024 8:49:00 AM

## 2024-05-28 NOTE — HPI
Chief: Right knee pain   History:   Basilio Francis is a 59 y.o. male seen today for evaluation of bilateral knee pain.  Symptoms began insidiously 25 years ago.  He had a right knee arthroscopy in 1999 and a left knee arthroscopy with Dr. Crews failed he has had progressive DJD over time he was treated in the past with periodic steroid injections.  He recently saw Rashida Gaitan TEE.  Plain film x-rays of both knees were obtained 04/08/2024 an MRI examination of the right knee was obtained on 04/23/2024.  Both shows severe tricompartmental DJD of both knees.  He resides in [facility].  He was not on any blood thinners.  He was not see primary care physician routinely.  He was 5 ft 7 in tall and weighs 135.1 kg with a BMI of 46.65.  She was never had a heart attack or stroke.  He was not on any blood thinners.  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  Procedure was shown in detail using models and actual components.  Potential benefits and risks surgery outlined to include but not limited to bleeding, infection, damage to blood vessels and nerves, need further surgery, other risks and complications including even death the patient wished proceed.

## 2024-05-28 NOTE — PT/OT/SLP EVAL
Occupational Therapy   Evaluation    Name: Basilio Francis  MRN: [id number]  Admitting Diagnosis: Primary osteoarthritis of right knee  Recent Surgery: Procedure(s) (LRB):  ARTHROPLASTY, KNEE, TOTAL, USING COMPUTER-ASSISTED NAVIGATION (Right) Day of Surgery    Recommendations:     Discharge Recommendations: Low Intensity Therapy  Discharge Equipment Recommendations:  none  Barriers to discharge:  None    Assessment:     Basilio Francis is a 59 y.o. male with a medical diagnosis of Primary osteoarthritis of right knee.  He presents with s/p right TKR on 5/28/24. Performance deficits affecting function: impaired self care skills, impaired functional mobility, gait instability, impaired balance, pain.      Rehab Prognosis: Good; patient would benefit from acute skilled OT services to address these deficits and reach maximum level of function.       Plan:     Patient to be seen 5 x/week to address the above listed problems via self-care/home management, therapeutic activities, therapeutic exercises  Plan of Care Expires: 06/04/24  Plan of Care Reviewed with: patient    Subjective     Chief Complaint: s/p right TKR   Patient/Family Comments/goals: pt agreeable to OT eval    Occupational Profile:  Living Environment: pt lives alone in one story home with ramp to enter; pt reports sister lives next door and checks in frequently  Previous level of function: independent with all ADL tasks and functional mobility   Roles and Routines: perform self care  Equipment Used at Home: crutches, axillary, walker, rolling  Assistance upon Discharge: home with home health    Pain/Comfort:  Pain Rating 1: 0/10  Location - Side 1: Right  Location 1: knee  Pain Addressed 1: Pre-medicate for activity    Patients cultural, spiritual, Yazdanism conflicts given the current situation: no    Objective:     Communicated with: JUSTIN Whitehead prior to session.  Patient found supine with blood pressure cuff, knee immobilizer, hemovac, peripheral IV, pulse  ox (continuous), SCD upon OT entry to room.    General Precautions: Standard, fall  Orthopedic Precautions: RLE weight bearing as tolerated  Braces: Knee immobilizer  Respiratory Status: Room air    Occupational Performance:    Bed Mobility:    Patient completed Supine to Sit with minimum assistance    Functional Mobility/Transfers:  Patient completed Sit <> Stand Transfer with minimum assistance  with  rolling walker   Patient completed Bed <> Chair Transfer using Step Transfer technique with contact guard assistance with rolling walker  Functional Mobility: pt performed functional mobility in room with CGA with RW    Activities of Daily Living:  Lower Body Dressing: maximal assistance to edyta socks    Cognitive/Visual Perceptual:  Cognitive/Psychosocial Skills:     -       Oriented to: Person, Place, Time, and Situation   -       Follows Commands/attention:Follows multistep  commands  -       Mood/Affect/Coping skills/emotional control: Cooperative    Physical Exam:  Balance:    -       sitting balance SBA; standing balance CGA with RW  Upper Extremity Range of Motion:     -       Right Upper Extremity: WFL  -       Left Upper Extremity: WFL  Upper Extremity Strength:    -       Right Upper Extremity: WFL  -       Left Upper Extremity: WFL  Gross motor coordination:   WFL    AMPAC 6 Click ADL:  AMPAC Total Score: 21    Treatment & Education:  Pt educated on OT role/POC.   Importance of OOB activity with staff assistance.  Importance of sitting up in the chair throughout the day as tolerated, especially for meals   Safety during functional t/f and mobility with use of RW  Importance of assisting with self-care activities   All questions/concerns answered within OT scope of practice      Patient left up in chair with all lines intact, call button in reach, and JUSTIN Whitehead notified    GOALS:   Multidisciplinary Problems       Occupational Therapy Goals          Problem: Occupational Therapy    Goal Priority Disciplines  Outcome Interventions   Occupational Therapy Goal     OT, PT/OT Progressing    Description: ST.Pt will perform bathing with Bright with setup at EOB  2.Pt will perform UE dressing with Shelby  3.Pt will perform LE dressing with Bright  4.Pt will transfer bed/chair/bsc with CGA with RW  5.Pt will perform standing task x 2 min with CGA with RW  6.Tolerate 15 min of tx without fatigue.      LTG:   Restore to max I with selfcare and mobility.                           History:     Past Medical History:   Diagnosis Date    Diabetes mellitus     Essential (primary) hypertension        History reviewed. No pertinent surgical history.    Time Tracking:     OT Date of Treatment: 24  OT Start Time: 1357  OT Stop Time: 1415  OT Total Time (min): 18 min    Billable Minutes:Evaluation OT min complexity eval    2024

## 2024-05-28 NOTE — OP NOTE
[facility] - Orthopedic Periop Services  Surgery Department  Operative Note    SUMMARY     Date of Procedure: 5/28/2024     Procedure: Procedure(s) (LRB):  ARTHROPLASTY, KNEE, TOTAL, USING COMPUTER-ASSISTED NAVIGATION (Right)     Surgeons and Role:     * David Norris MD - Primary    Assisting Surgeon: None    Pre-Operative Diagnosis: Primary osteoarthritis of right knee [M17.11]    Post-Operative Diagnosis: Post-Op Diagnosis Codes:     * Primary osteoarthritis of right knee [M17.11]    Anesthesia: General    Technical Procedures Used:            DEPARTMENT OF ORTHOPEDIC SURGERY                OPERATIVE REPORT       NAME:  Basilio Francis  MRN: [id number]   DATE OF SURGERY:  5/28/2024      PREOPERATIVE DIAGNOSIS:  Primary osteoarthritis of right knee [M17.11]       POSTOPERATIVE DIAGNOSIS: Primary osteoarthritis of right knee [M17.11]       PROCEDURE:   Computer navigated right total knee replacement arthroplasty      ANESTHESIA:  General      PROCEDURE IN DETAIL:  The patient was taken to the operating room and placed in supine position.  After an adequate level of general/regional block anesthesia been achieved (see Anesthesia note) patient's right lower extremity is covered Betadine and draped in sterile fashion.  The right lower extremity was elevated exsanguinated with elastic bandage.  A tourniquet in place about the right upper thighs inflated to pressure of 300 mmHg.  The operation was begun by making a skin incision over the anterior midline region of the right knee.  The incision was carried carefully through the subcutaneous layers.  Skin flaps were developed.  The joint was opened through a medial parapatellar incision.  The patella was distracted laterally and the knee was flexed.  a thorough debridement of the joint was performed including removal of the remnants of the menisci.  The ACL was resected.  The PCL was retained.  The computer tracker was attached to the distal femur.  The hip was  brought through a circumduction maneuver registering the center of hip rotation.  A distal femoral cutting guide was attached to the arch bar.  The varus/valgus and flexion/extension parameters were adjusted and the resection guide was pinned in place.  The distal femoral resection was performed with the oscillating saw.  The guides were removed.  With the knee flexed the tibia was brought forward using the PCL guide.  The tibial spines were resected with an oscillating saw.  The tibial arch bar was attached to the tibial plateau cut surface of femoral, tibial and ankle data points were obtained.  Now the proximal tibial cutting guide was attached to the arch bar and adjusted for varus/valgus and depth of her sec.  The proximal tibial resection guide was then pinned in place and the arch bar was removed.  The proximal tibial resection cut was made with an oscillating saw.  Adequate resection was performed with a spacer.  The cutting guide was removed.  the gap space was tensioned using the Castorena guide and varus/valgus alignment was confirmed.  the measured flexion and extension gaps were set on the resection guide and drill holes made for the 4 in 1 cutting block.  The resection guide was attached and pinned in place.  The anterior, posterior, anterior chamfer and posterior chamfer cuts were made with an oscillating saw.  The guide was removed.  The femoral and tibial trials were put in place.  a tibial spacer was engaged with the base plate.  The knee was brought through good stable range of motion.  Rotation of the tibial tray was marked.  The distal femoral peg holes were made with the drill.  Attention was turned to the patella.  The patella was everted and measured with a caliper.  The articular surface of the patella was resected with a patellar mill leaving 14 mm of bone.  peg holes were drilled for the polyethylene patellar button.  Trial component was inserted and he was again brought to a good stable  range of motion.  The trial components were removed.  The knee joint was thoroughly irrigated with antibiotic solution.  The cut tibial surface was again brought anteriorly and a porous coated triathlon Bettendorf size 6 tibia porous-coated tibial component was inserted with a size 9 mm thick polyethylene tibial insert.  Now the size 6 porous-coated femoral component was implanted.  Knee was brought to good stable range of motion.  Attention was turned back to the patella.  The cut surface was irrigated with antibiotic solution.  A 35 mm diameter by 10  mm thick patellar polyethylene button was cemented in place with methylmethacrylate.  The cement was allowed to harden and the excess was removed.  The tourniquet was then let down and hemostasis was achieved with Bovie electrocautery.  The knee was again irrigated with antibiotic solution.  The quadriceps and medial retinacular tissues were reapproximated with interrupted 1. Vicryl suture.  Subcutaneous tissue was approximated with 2 O Vicryl suture.  Skin margin approximated with stainless steel staples.  Wounds were dressed sterilely and a knee immobilizer was applied.  Patient taken to recovery room in satisfactory condition.  Estimated blood loss 50 cc        David Norris MD           Description of the Findings of the Procedure:  Severe DJD right knee    Significant Surgical Tasks Conducted by the Assistant(s), if Applicable:     Complications: No    Estimated Blood Loss (EBL): 50 mL           Implants:   Implant Name Type Inv. Item Serial No.  Lot No. LRB No. Used Action   CEMENT BONE SPEED SET - [id number]  CEMENT BONE SPEED SET  [employer] ELIZABETH. [id number] Right 1 Implanted   GUIDEPIN 3.20MM 4 DAVID SQUARE - [id number]  GUIDEPIN 3.20MM 4 DAVID SQUARE  [facility]  Right 1 Implanted and Explanted   GUIDESCREW 6 DAVID HEXAGONAL 3.20MM - [id number]  GUIDESCREW 6 DAVID HEXAGONAL 3.20MM  [facility]  Right 1 Implanted and  Explanted   PATELLA TRIATHLON ASYM 31X9MM - [id number]  PATELLA TRIATHLON ASYM 31X9MM  [employer] ELIZABETH. [id number] Right 1 Implanted   COMP FEM CR BEAD SZ 6 RIGHT - [id number]  COMP FEM CR BEAD SZ 6 RIGHT  [employer] ELIZABETH. [id number] Right 1 Implanted   INSERT X3 BEAR TIB 9MM SZ 6 - [id number]  INSERT X3 BEAR TIB 9MM SZ 6  [employer] ELIZABETH. [id number] Right 1 Implanted   BASEPLATE TRIATHLON TIB SZ 6 - [id number]  BASEPLATE TRIATHLON TIB SZ 6  [employer] ELIZABETH. [id number] Right 1 Implanted       Specimens:   Specimen (24h ago, onward)      None                    Condition: Good    Disposition: PACU - hemodynamically stable.    Attestation: I was present and scrubbed for the entire procedure.

## 2024-05-29 VITALS
HEIGHT: 69 IN | TEMPERATURE: 97 F | OXYGEN SATURATION: 97 % | HEART RATE: 51 BPM | RESPIRATION RATE: 18 BRPM | BODY MASS INDEX: 44.28 KG/M2 | SYSTOLIC BLOOD PRESSURE: 124 MMHG | DIASTOLIC BLOOD PRESSURE: 65 MMHG | WEIGHT: 299 LBS

## 2024-05-29 LAB
ALBUMIN SERPL BCP-MCNC: 3.2 G/DL (ref 3.5–5)
ALBUMIN/GLOB SERPL: 1 {RATIO}
ALP SERPL-CCNC: 77 U/L (ref 45–115)
ALT SERPL W P-5'-P-CCNC: 23 U/L (ref 16–61)
ANION GAP SERPL CALCULATED.3IONS-SCNC: 11 MMOL/L (ref 7–16)
AST SERPL W P-5'-P-CCNC: 30 U/L (ref 15–37)
BASOPHILS # BLD AUTO: 0.01 K/UL (ref 0–0.2)
BASOPHILS NFR BLD AUTO: 0.2 % (ref 0–1)
BILIRUB SERPL-MCNC: 0.6 MG/DL (ref ?–1.2)
BUN SERPL-MCNC: 15 MG/DL (ref 7–18)
BUN/CREAT SERPL: 13 (ref 6–20)
CALCIUM SERPL-MCNC: 8.8 MG/DL (ref 8.5–10.1)
CHLORIDE SERPL-SCNC: 107 MMOL/L (ref 98–107)
CO2 SERPL-SCNC: 23 MMOL/L (ref 21–32)
CREAT SERPL-MCNC: 1.15 MG/DL (ref 0.7–1.3)
DIFFERENTIAL METHOD BLD: ABNORMAL
EGFR (NO RACE VARIABLE) (RUSH/TITUS): 73 ML/MIN/1.73M2
EOSINOPHIL # BLD AUTO: 0 K/UL (ref 0–0.5)
EOSINOPHIL NFR BLD AUTO: 0 % (ref 1–4)
ERYTHROCYTE [DISTWIDTH] IN BLOOD BY AUTOMATED COUNT: 13.2 % (ref 11.5–14.5)
GLOBULIN SER-MCNC: 3.3 G/DL (ref 2–4)
GLUCOSE SERPL-MCNC: 123 MG/DL (ref 70–105)
GLUCOSE SERPL-MCNC: 125 MG/DL (ref 70–105)
GLUCOSE SERPL-MCNC: 154 MG/DL (ref 74–106)
HCT VFR BLD AUTO: 35.3 % (ref 40–54)
HGB BLD-MCNC: 11.7 G/DL (ref 13.5–18)
IMM GRANULOCYTES # BLD AUTO: 0.02 K/UL (ref 0–0.04)
IMM GRANULOCYTES NFR BLD: 0.3 % (ref 0–0.4)
LYMPHOCYTES # BLD AUTO: 0.96 K/UL (ref 1–4.8)
LYMPHOCYTES NFR BLD AUTO: 14.5 % (ref 27–41)
MCH RBC QN AUTO: 30.1 PG (ref 27–31)
MCHC RBC AUTO-ENTMCNC: 33.1 G/DL (ref 32–36)
MCV RBC AUTO: 90.7 FL (ref 80–96)
MONOCYTES # BLD AUTO: 0.47 K/UL (ref 0–0.8)
MONOCYTES NFR BLD AUTO: 7.1 % (ref 2–6)
MPC BLD CALC-MCNC: 10.2 FL (ref 9.4–12.4)
NEUTROPHILS # BLD AUTO: 5.15 K/UL (ref 1.8–7.7)
NEUTROPHILS NFR BLD AUTO: 77.9 % (ref 53–65)
NRBC # BLD AUTO: 0 X10E3/UL
NRBC, AUTO (.00): 0 %
PLATELET # BLD AUTO: 91 K/UL (ref 150–400)
POTASSIUM SERPL-SCNC: 4.8 MMOL/L (ref 3.5–5.1)
PROT SERPL-MCNC: 6.5 G/DL (ref 6.4–8.2)
RBC # BLD AUTO: 3.89 M/UL (ref 4.6–6.2)
SODIUM SERPL-SCNC: 136 MMOL/L (ref 136–145)
WBC # BLD AUTO: 6.61 K/UL (ref 4.5–11)

## 2024-05-29 PROCEDURE — 80053 COMPREHEN METABOLIC PANEL: CPT | Performed by: ORTHOPAEDIC SURGERY

## 2024-05-29 PROCEDURE — 99900035 HC TECH TIME PER 15 MIN (STAT)

## 2024-05-29 PROCEDURE — 36415 COLL VENOUS BLD VENIPUNCTURE: CPT | Performed by: ORTHOPAEDIC SURGERY

## 2024-05-29 PROCEDURE — 25000003 PHARM REV CODE 250: Performed by: ORTHOPAEDIC SURGERY

## 2024-05-29 PROCEDURE — 97535 SELF CARE MNGMENT TRAINING: CPT

## 2024-05-29 PROCEDURE — 94761 N-INVAS EAR/PLS OXIMETRY MLT: CPT

## 2024-05-29 PROCEDURE — 63600175 PHARM REV CODE 636 W HCPCS: Performed by: ORTHOPAEDIC SURGERY

## 2024-05-29 PROCEDURE — 97110 THERAPEUTIC EXERCISES: CPT

## 2024-05-29 PROCEDURE — 97116 GAIT TRAINING THERAPY: CPT

## 2024-05-29 PROCEDURE — 25000003 PHARM REV CODE 250: Performed by: HOSPITALIST

## 2024-05-29 PROCEDURE — 82962 GLUCOSE BLOOD TEST: CPT

## 2024-05-29 PROCEDURE — 85025 COMPLETE CBC W/AUTO DIFF WBC: CPT | Performed by: ORTHOPAEDIC SURGERY

## 2024-05-29 RX ORDER — HYDROCODONE BITARTRATE AND ACETAMINOPHEN 10; 325 MG/1; MG/1
1 TABLET ORAL EVERY 6 HOURS PRN
Qty: 28 TABLET | Refills: 0 | Status: SHIPPED | OUTPATIENT
Start: 2024-05-29 | End: 2024-06-05

## 2024-05-29 RX ORDER — SODIUM CHLORIDE, SODIUM LACTATE, POTASSIUM CHLORIDE, CALCIUM CHLORIDE 600; 310; 30; 20 MG/100ML; MG/100ML; MG/100ML; MG/100ML
INJECTION, SOLUTION INTRAVENOUS CONTINUOUS
Status: DISCONTINUED | OUTPATIENT
Start: 2024-05-29 | End: 2024-05-29

## 2024-05-29 RX ORDER — LIDOCAINE HYDROCHLORIDE 10 MG/ML
1 INJECTION INFILTRATION; PERINEURAL ONCE
Status: DISCONTINUED | OUTPATIENT
Start: 2024-05-29 | End: 2024-05-29

## 2024-05-29 RX ADMIN — LOSARTAN POTASSIUM 25 MG: 25 TABLET, FILM COATED ORAL at 10:05

## 2024-05-29 RX ADMIN — HYDROCODONE BITARTRATE AND ACETAMINOPHEN 1 TABLET: 10; 325 TABLET ORAL at 06:05

## 2024-05-29 RX ADMIN — MORPHINE SULFATE 4 MG: 4 INJECTION INTRAVENOUS at 09:05

## 2024-05-29 RX ADMIN — APIXABAN 2.5 MG: 2.5 TABLET, FILM COATED ORAL at 10:05

## 2024-05-29 RX ADMIN — CEFAZOLIN 2 G: 2 INJECTION, POWDER, FOR SOLUTION INTRAMUSCULAR; INTRAVENOUS at 01:05

## 2024-05-29 RX ADMIN — ALLOPURINOL 300 MG: 300 TABLET ORAL at 10:05

## 2024-05-29 NOTE — PT/OT/SLP PROGRESS
Occupational Therapy   Treatment    Name: Basilio Francis  MRN: [id number]  Admitting Diagnosis:  Primary osteoarthritis of right knee  1 Day Post-Op    Recommendations:     Discharge Recommendations: Low Intensity Therapy  Discharge Equipment Recommendations:  none  Barriers to discharge:  None    Assessment:     Basilio Francis is a 59 y.o. male with a medical diagnosis of Primary osteoarthritis of right knee.  He presents with s/p right TKR on 5/28/24. Performance deficits affecting function are impaired self care skills, impaired functional mobility, gait instability, impaired balance, pain.     Rehab Prognosis:  Good; patient would benefit from acute skilled OT services to address these deficits and reach maximum level of function.       Plan:     Patient to be seen 5 x/week to address the above listed problems via self-care/home management, therapeutic activities, therapeutic exercises  Plan of Care Expires: 06/04/24  Plan of Care Reviewed with: patient    Subjective     Chief Complaint: s/p right TKR  Patient/Family Comments/goals: pt agreeable to OT tx  Pain/Comfort:  Pain Rating 1: 3/10  Location - Side 1: Right  Location 1: knee  Pain Addressed 1: Pre-medicate for activity    Objective:     Communicated with: JUSTIN Whitehead prior to session.  Patient found up in chair with hemovac upon OT entry to room.    General Precautions: Standard, fall    Orthopedic Precautions:RLE weight bearing as tolerated  Braces: Knee immobilizer  Respiratory Status: Room air     Occupational Performance:     Bed Mobility:    Not performed     Functional Mobility/Transfers:  Patient completed Sit <> Stand Transfer with contact guard assistance  with  rolling walker   Functional Mobility: not performed    Activities of Daily Living:  Upper Body Dressing: modified independence to edyta shirt  Lower Body Dressing: minimum assistance to edyta shorts      AMPAC 6 Click ADL:      Treatment & Education:  Pt performed ADL training as listed  above.     Patient left up in chair with all lines intact, call button in reach, and JUSTIN Whitehead present    GOALS:   Multidisciplinary Problems       Occupational Therapy Goals          Problem: Occupational Therapy    Goal Priority Disciplines Outcome Interventions   Occupational Therapy Goal     OT, PT/OT Progressing    Description: ST.Pt will perform bathing with Bright with setup at EOB  2.Pt will perform UE dressing with Shelby  3.Pt will perform LE dressing with Bright  4.Pt will transfer bed/chair/bsc with CGA with RW  5.Pt will perform standing task x 2 min with CGA with RW  6.Tolerate 15 min of tx without fatigue.      LTG:   Restore to max I with selfcare and mobility.                           Time Tracking:     OT Date of Treatment: 24  OT Start Time: 1002  OT Stop Time: 1012  OT Total Time (min): 10 min    Billable Minutes:Self Care/Home Management 10 minutes    OT/ITALIA: OT          2024

## 2024-05-29 NOTE — PT/OT/SLP PROGRESS
Physical Therapy Treatment    Patient Name:  Basilio Francis   MRN:  [id number]    Recommendations:     Discharge Recommendations: Low Intensity Therapy  Discharge Equipment Recommendations: none  Barriers to discharge: Decreased caregiver support and ongoing medical care    Assessment:     Basilio Francis is a 59 y.o. male admitted with a medical diagnosis of Primary osteoarthritis of right knee.  He presents with the following impairments/functional limitations: weakness, impaired endurance, impaired functional mobility, gait instability, decreased lower extremity function, pain, decreased ROM, orthopedic precautions. R knee Rom 10-73 degrees.    Rehab Prognosis: Good; patient would benefit from acute skilled PT services to address these deficits and reach maximum level of function.    Recent Surgery: Procedure(s) (LRB):  ARTHROPLASTY, KNEE, TOTAL, USING COMPUTER-ASSISTED NAVIGATION (Right) 1 Day Post-Op    Plan:     During this hospitalization, patient to be seen BID (5x/wk; daily 2x/wk) to address the identified rehab impairments via gait training, therapeutic activities, therapeutic exercises and progress toward the following goals:    Plan of Care Expires:  06/28/24    Subjective     Chief Complaint: R TKA  Patient/Family Comments/goals: agreeable  Pain/Comfort: 8/10 R knee         Objective:     Communicated with DIONTE Gonzalez RN prior to session.  Patient found HOB elevated with blood pressure cuff, peripheral IV, knee immobilizer, pulse ox (continuous), hemovac, SCD upon PT entry to room.     General Precautions: Standard, fall  Orthopedic Precautions: RLE weight bearing as tolerated  Braces: Knee immobilizer  Respiratory Status: Room air     Functional Mobility:  Bed Mobility:     Supine to Sit: contact guard assistance and minimum assistance  Transfers:     Sit to Stand:  contact guard assistance with rolling walker  Gait: 70ft with RW, CGA to SBA, slow subhash, slight axial flexion, step-to gait pattern,  antalgic gait  Balance: Fair to Fair+ in stance      AM-PAC 6 CLICK MOBILITY          Treatment & Education:  Bilateral lower extremity exercise x 20 reps: ankle pumps, Quad sets, glut sets, heel slides, hip abduction/adduction, straight leg raises, Long arc quads, and Marching with active assist ROM, verbal cues for sequencing and safety, and tactile cues     Patient left up in chair with all lines intact, call button in reach, RN notified, and friend present..    GOALS:   Multidisciplinary Problems       Physical Therapy Goals          Problem: Physical Therapy    Goal Priority Disciplines Outcome Goal Variances Interventions   Physical Therapy Goal     PT, PT/OT Progressing     Description: Short Term Goals to be met by: 6/10/24    Patient will increase functional independence with mobility by performin. Supine to sit with Modified [place]  2. Sit to stand transfer with Modified [place]  3. Bed to chair transfer with Modified [place] using Rolling Walker, WBAT R LE  4. Gait  x 100 feet with Modified [place] using Rolling Walker, WBAT R LE.   5. Lower extremity exercise program x30 reps per handout, with assistance as needed  6. Knee ROM 0-90    Long Term Goals to be met by: 24    Pt will regain full independent functional mobility to return to prior activities of daily living.                        Time Tracking:     PT Received On: 24  PT Start Time: 855     PT Stop Time: 927  PT Total Time (min): 32 min     Billable Minutes: Gait Training 12 and Therapeutic Exercise 15    Treatment Type: Evaluation  PT/PTA: PT           2024

## 2024-05-29 NOTE — DISCHARGE SUMMARY
"  Department of Orthopedic Surgery  Discharge Note    Admit Date: 5/28/2024  Discharge Date and Time: 5/29/2024   Attending Physician: David Norris MD   Discharge Provider: Malka Lebron    Pre-op Diagnosis: Primary osteoarthritis of right knee [M17.11]  Procedure:  Right total knee arthroplasty  Final Diagnosis:     [place] [facility] [place] Condition: good    Hospital Course:   Patient came in on 5/28/2024 and underwent right total knee arthroplasty without complications. POD1 patient sitting up in bed without complaints this morning.  Denies any nausea, shortness of breath or chest pain this morning.  Okay to DC home.  Okay to discontinue Hemovac tomorrow with home health.  Okay to DC staples at 2 weeks postoperatively.  Discussed wearing the MART hose for up to 4 weeks postoperatively may remove b.i.d. for an hour each time.  Discussed Eliquis 2.5 mg b.i.d. for 12 days and Norco 10/325 p.r.n. pain.  Follow up with me in clinic in 2-3 weeks, sooner as needed.    Physical Exam:  Right knee with surgical dressing a knee immobilizer in place, Hemovac in place, good range of motion with dorsiflexion and plantar flexion of the foot, palpable dorsalis pedis pulse, neurovascularly intact  Surgical incision is clean,dry,intact with minimal/expected erythema and swelling    Discharge Instructions:   Discharge Procedure Orders (must include Diet, Follow-up, Activity)   WALKER FOR HOME USE     Order Specific Question Answer Comments   Type of Walker: Adult (5'4"-6'6")    With wheels? Yes    Height: 5' 9" (1.753 m)    Weight: 135.6 kg (299 lb)    Length of need (1-99 months): 3    Does patient have medical equipment at home? none    Please check all that apply: Patient's condition impairs ambulation.    Please check all that apply: Patient is unable to safely ambulate without equipment.      3 IN 1 COMMODE FOR HOME USE     Order Specific Question Answer Comments   Type: Standard    Height: 5' 9" " (1.753 m)    Weight: 135.6 kg (299 lb)    Does patient have medical equipment at home? none    Length of need (1-99 months): 3      Referral to Home health   Referral Priority: Routine Referral Type: Home Health   Referral Reason: Specialty Services Required   Requested Specialty: Home Health Services   Number of Visits Requested: 1     Diet general     Keep surgical extremity elevated   Order Comments: Elevated at ankle, no pillow under knee.  Wear MART hose, may remove twice a day for 1 hour then replace. Continue incentive spirometry every 2 hours while awake. Increase fluid intake. Continue Nozin nasal swab to nares twice a day until bottle is empty. Physical therapy daily x 5 days then 3 x week     Ice to affected area   Order Comments: using barrier between ice and skin (specify duration&frequency). Apply Cool Jet to operative extremity.     No driving, operating heavy equipment or signing legal documents while taking pain medication     Change dressing (specify)   Order Comments: Swingbed / Home health nurse to change dressing on post-op day #3.  Apply Aquacel AG dressing. Repeat dressing change in 7 days.  Notify physician of any wound drainage. Dc staples in 2 weeks from surgery and steristrip incision. Home health physical therapy daily x 5 days then 3 x week     Call MD for:  temperature >100.4     Call MD for:  redness, tenderness, or signs of infection (pain, swelling, redness, odor or green/yellow discharge around incision site)     Weight bearing as tolerated   Order Comments: With walker        Medications:     Medication List        START taking these medications      apixaban 2.5 mg Tab  Commonly known as: ELIQUIS  Take 1 tablet (2.5 mg total) by mouth 2 (two) times daily. for 12 days     HYDROcodone-acetaminophen  mg per tablet  Commonly known as: NORCO  Take 1 tablet by mouth every 6 (six) hours as needed for Pain.            CONTINUE taking these medications      allopurinoL 300 MG  tablet  Commonly known as: ZYLOPRIM     atorvastatin 10 MG tablet  Commonly known as: LIPITOR     gemfibroziL 600 MG tablet  Commonly known as: LOPID     losartan 25 MG tablet  Commonly known as: COZAAR     metFORMIN 500 MG tablet  Commonly known as: GLUCOPHAGE            STOP taking these medications      aspirin 81 MG EC tablet  Commonly known as: ECOTRIN     meloxicam 15 MG tablet  Commonly known as: MOBIC               Where to Get Your Medications        These medications were sent to [facility]  [address] [place] [id number]      Hours: Mon-Fri, 8:30a-5:00p Phone: [phone]   apixaban 2.5 mg Tab  HYDROcodone-acetaminophen  mg per tablet           Follow up/Patient Instructions:     Follow-up Information       Malka Lebron PA Follow up.    Specialty: Orthopedic Surgery  Contact information:  [address] [place] [address]  [phone]

## 2024-05-29 NOTE — ASSESSMENT & PLAN NOTE
Chronic, controlled. Latest blood pressure and vitals reviewed-     Temp:  [97.5 °F (36.4 °C)-99.5 °F (37.5 °C)]   Pulse:  [55-72]   Resp:  [12-18]   BP: (108-152)/(42-76)   SpO2:  [91 %-99 %] .   Home meds for hypertension were reviewed and noted below.   Hypertension Medications               losartan (COZAAR) 25 MG tablet Take 25 mg by mouth.            While in the hospital, will manage blood pressure as follows; Continue home antihypertensive regimen    Will utilize p.r.n. blood pressure medication only if patient's blood pressure greater than 140/90 and he develops symptoms such as worsening chest pain or shortness of breath.

## 2024-05-29 NOTE — NURSING
Discharge instructions reviewed with patient and friend; and copy given to patient. Patient and friend voiced understanding regarding:meds, appt., signs and symptoms to report to physician.  Upon teaching patient to empty and charge hemovac, it is noted  that hemovac contained 260 ml of serosanguinos drainage; notified dr deluna of drainage and was instructed to continue as planned and home health will pull hemovac tomorrow. Patient voiced understanding.  *Keep dressing dry and intact, do not remove dressing, if dressing becomes wet or bloody notify home health staff.  Swingbed/Home Health will remove your drain (if you have one) on post op day #2 and change your dressing on post op day #3 and day #10, give them that special dressing we sent home with you.  *Continue incentive spirometry at least every 2 hours while awake.  *Continue white stockings remove 2 times a day for 1 hour and replace. Once dressing is changed, apply other stocking to surgery leg.   *Continue cool-jet to knee. Do not apply directly to skin.   *Take laxative of choice to have a bowel movement at least by tomorrow and then every other day.  *Increase fluids by mouth.  *Staples will be removed by home health/swingbed staff 2 weeks from surgery prior to follow up appoinment.  *Elevate surgery leg on pillow at ankle. No pillow under knees.  *Notify swingbed/home health staff of any concerns.     Verified with patient and nurse sivan murphy lpn that patient has the following for discharge:2 silvino hoses, incentive spirometer,nozin, rolling walker,hemovac, drainage cup.; dressing change, discharge meds, icepacks, immobilizer.

## 2024-05-29 NOTE — ASSESSMENT & PLAN NOTE
"Patient's FSGs are controlled on current medication regimen.  Last A1c reviewed- No results found for: "LABA1C", "HGBA1C"  Most recent fingerstick glucose reviewed- No results for input(s): "POCTGLUCOSE" in the last 24 hours.  Current correctional scale  Medium  Maintain anti-hyperglycemic dose as follows-       Hold Oral hypoglycemics while patient is in the hospital.  Correctional scale insulin while in hospital.    "

## 2024-05-29 NOTE — ASSESSMENT & PLAN NOTE
Body mass index is 44.15 kg/m². Morbid obesity complicates all aspects of disease management from diagnostic modalities to treatment. Weight loss encouraged and health benefits explained to patient.

## 2024-05-29 NOTE — CONSULTS
[facility]  [facility] Medicine  Consult Note    Patient Name: Basilio Francis  MRN: [id number]  Admission Date: 5/28/2024  Hospital Length of Stay: 0 days  Attending Physician: aDvid Norris MD   Primary Care Provider: Fernandez Lima DO           Patient information was obtained from patient, past medical records, and ER records.     Consults  Subjective:     Principal Problem: Primary osteoarthritis of right knee    Chief Complaint: No chief complaint on file.       HPI: Patient is a 59 y.o. male s/p Right total knee replacement for Primary osteoarthritis of right knee by Dr. Norris. Preop evaluation note reviewed of 5/22/24.  At time of exam, pt has no acute complaints or concerns. His pain is well controlled for now. Appetite is normal. Normal bowel and bladder habit.Pt  has a past medical history of Essential (primary) hypertension, Gout, arthritis, Mixed hyperlipidemia, and Type 2 diabetes mellitus without complication, without long-term current use of insulin (05/22/2024).  Home medications were reviewed and resumed. Will hold oral hypoglycemic meds for now. Started on Medium correctional dose Insulin.  This consult was performed under the direct supervision of Dr. Rankin. Thank you for allowing the Medicine team to be involved in the care of this patient.       Past Medical History:   Diagnosis Date    Essential (primary) hypertension     Gout, arthritis     Mixed hyperlipidemia     Type 2 diabetes mellitus without complication, without long-term current use of insulin 05/22/2024       History reviewed. No pertinent surgical history.    Review of patient's allergies indicates:  No Known Allergies    No current facility-administered medications on file prior to encounter.     Current Outpatient Medications on File Prior to Encounter   Medication Sig    atorvastatin (LIPITOR) 10 MG tablet Take 10 mg by mouth.    gemfibroziL (LOPID) 600 MG tablet Take 600 mg by mouth.    losartan  (COZAAR) 25 MG tablet Take 25 mg by mouth.    [DISCONTINUED] meloxicam (MOBIC) 15 MG tablet Take 15 mg by mouth.    allopurinoL (ZYLOPRIM) 300 MG tablet Take 300 mg by mouth.    aspirin (ECOTRIN) 81 MG EC tablet Take 81 mg by mouth once daily.    meloxicam (MOBIC) 15 MG tablet Take 15 mg by mouth once daily.    metFORMIN (GLUCOPHAGE) 500 MG tablet Take 500 mg by mouth.    [DISCONTINUED] acyclovir (ZOVIRAX) 400 MG tablet Take 1 tablet (400 mg total) by mouth 5 (five) times daily. for 7 days (Patient not taking: Reported on 5/22/2024)     Family History    None       Tobacco Use    Smoking status: Never    Smokeless tobacco: Current     Types: Snuff   Substance and Sexual Activity    Alcohol use: Never    Drug use: Never    Sexual activity: Not on file     Review of Systems   Constitutional:  Positive for activity change. Negative for chills, fatigue and fever.   HENT:  Negative for sore throat.    Respiratory:  Negative for apnea, cough, choking, chest tightness, shortness of breath and wheezing.    Cardiovascular:  Negative for chest pain and palpitations.   Gastrointestinal:  Negative for abdominal pain, constipation, diarrhea, nausea and vomiting.   Genitourinary:  Negative for dysuria.   Musculoskeletal:  Positive for arthralgias.   Neurological:  Negative for dizziness and headaches.   Psychiatric/Behavioral:  Negative for agitation.      Objective:     Vital Signs (Most Recent):  Temp: 97.5 °F (36.4 °C) (05/28/24 2010)  Pulse: (!) 56 (05/28/24 2010)  Resp: 18 (05/28/24 2010)  BP: 121/64 (05/28/24 2010)  SpO2: (!) 93 % (05/28/24 2010) Vital Signs (24h Range):  Temp:  [97.5 °F (36.4 °C)-99.5 °F (37.5 °C)] 97.5 °F (36.4 °C)  Pulse:  [55-72] 56  Resp:  [12-18] 18  SpO2:  [91 %-99 %] 93 %  BP: (108-152)/(42-76) 121/64     Weight: 135.6 kg (299 lb)  Body mass index is 44.15 kg/m².     Physical Exam  Vitals and nursing note reviewed.   Constitutional:       Appearance: Normal appearance. He is obese.   HENT:       Mouth/Throat:      Mouth: Mucous membranes are moist.   Cardiovascular:      Rate and Rhythm: Normal rate and regular rhythm.      Pulses: Normal pulses.      Heart sounds: Normal heart sounds.   Pulmonary:      Breath sounds: Normal breath sounds.   Abdominal:      General: Bowel sounds are normal.      Palpations: Abdomen is soft.   Musculoskeletal:      Cervical back: Neck supple.      Right lower leg: No edema.      Left lower leg: No edema.      Comments: Dressing in situ in right knee.   Skin:     General: Skin is warm.      Capillary Refill: Capillary refill takes less than 2 seconds.   Neurological:      Mental Status: He is oriented to person, place, and time.   Psychiatric:         Mood and Affect: Mood normal.          Significant Labs: All pertinent labs within the past 24 hours have been reviewed.    Significant Imaging: I have reviewed all pertinent imaging results/findings within the past 24 hours.  Assessment/Plan:     * Primary osteoarthritis of right knee  Defer to primary surgical team.    Agree with pain control, DVT prophylaxis and rehab      Gout, arthritis  Continue home allopurinol        Mixed hyperlipidemia  Continue home Atorvastatin.      Essential (primary) hypertension  Chronic, controlled. Latest blood pressure and vitals reviewed-     Temp:  [97.5 °F (36.4 °C)-99.5 °F (37.5 °C)]   Pulse:  [55-72]   Resp:  [12-18]   BP: (108-152)/(42-76)   SpO2:  [91 %-99 %] .   Home meds for hypertension were reviewed and noted below.   Hypertension Medications               losartan (COZAAR) 25 MG tablet Take 25 mg by mouth.            While in the hospital, will manage blood pressure as follows; Continue home antihypertensive regimen    Will utilize p.r.n. blood pressure medication only if patient's blood pressure greater than 140/90 and he develops symptoms such as worsening chest pain or shortness of breath.    Class 3 severe obesity with body mass index (BMI) of 45.0 to 49.9 in adult  Body mass  "index is 44.15 kg/m². Morbid obesity complicates all aspects of disease management from diagnostic modalities to treatment. Weight loss encouraged and health benefits explained to patient.         Type 2 diabetes mellitus without complication, without long-term current use of insulin  Patient's FSGs are controlled on current medication regimen.  Last A1c reviewed- No results found for: "LABA1C", "HGBA1C"  Most recent fingerstick glucose reviewed- No results for input(s): "POCTGLUCOSE" in the last 24 hours.  Current correctional scale  Medium  Maintain anti-hyperglycemic dose as follows-       Hold Oral hypoglycemics while patient is in the hospital.  Correctional scale insulin while in hospital.        VTE Risk Mitigation (From admission, onward)           Ordered     apixaban tablet 2.5 mg  2 times daily         05/28/24 1230     IP VTE HIGH RISK PATIENT  Once         05/28/24 1230     Place MART hose  Until discontinued        Comments: MART to non-operative leg.    05/28/24 1230                        Thank you for your consult. I will follow-up with patient. Please contact us if you have any additional questions.    Arleen Ramesh MD  Department of Hospital Medicine   [facility]      "

## 2024-05-29 NOTE — PLAN OF CARE
Problem: Adult Inpatient Plan of Care  Goal: Plan of Care Review  Outcome: Progressing  Goal: Patient-Specific Goal (Individualized)  Outcome: Progressing  Goal: Absence of Hospital-Acquired Illness or Injury  Outcome: Progressing  Goal: Optimal Comfort and Wellbeing  Outcome: Progressing  Goal: Readiness for Transition of Care  Outcome: Progressing     Problem: Diabetes Comorbidity  Goal: Blood Glucose Level Within Targeted Range  Outcome: Progressing     Problem: Bariatric Environmental Safety  Goal: Safety Maintained with Care  Outcome: Progressing     Problem: Wound  Goal: Optimal Coping  Outcome: Progressing  Goal: Optimal Functional Ability  Outcome: Progressing  Goal: Absence of Infection Signs and Symptoms  Outcome: Progressing  Goal: Improved Oral Intake  Outcome: Progressing  Goal: Optimal Pain Control and Function  Outcome: Progressing  Goal: Skin Health and Integrity  Outcome: Progressing  Goal: Optimal Wound Healing  Outcome: Progressing

## 2024-05-29 NOTE — PLAN OF CARE
[facility]  Discharge Final Note    Primary Care Provider: Fernandez Lima DO    Expected Discharge Date: 5/29/2024    Final Discharge Note (most recent)       Final Note - 05/29/24 1113          Final Note    Assessment Type Final Discharge Note     Anticipated Discharge Disposition [place] [facility]     What phone number can be called within the next 1-3 days to see how you are doing after discharge? [phone]        Post-Acute Status    Post-Acute Authorization Home Health;HME     HME Status Set-up Complete/Auth obtained     Home Health Status Set-up Complete/Auth obtained     Patient choice form signed by patient/caregiver List with quality metrics by geographic area provided;List from CMS Compare     Discharge Delays None known at this time                    Follow-up providers       Malka Lebron PA   Specialty: Orthopedic Surgery    [address] [place] [id number]   Phone: [phone]       Next Steps: Follow up in 2 week(s)    Instructions: Please follow up on June 12 at 10:20              After-discharge care                Durable Medical Equipment       [facility]   Service: Durable Medical Equipment    [address] [place] [address]   Phone: [phone]                 Home Medical Care       [facility]   Service: Home Rehabilitation    [address] [facility] [place] [address]   Phone: [phone]                             SW spoke with pt and received choice for [facility]. RW delivered to pts room. JURGEN faxed dc orders to [facility] and informed Erma of dc today. No other needs.

## 2024-05-29 NOTE — SUBJECTIVE & OBJECTIVE
Past Medical History:   Diagnosis Date    Essential (primary) hypertension     Gout, arthritis     Mixed hyperlipidemia     Type 2 diabetes mellitus without complication, without long-term current use of insulin 05/22/2024       History reviewed. No pertinent surgical history.    Review of patient's allergies indicates:  No Known Allergies    No current facility-administered medications on file prior to encounter.     Current Outpatient Medications on File Prior to Encounter   Medication Sig    atorvastatin (LIPITOR) 10 MG tablet Take 10 mg by mouth.    gemfibroziL (LOPID) 600 MG tablet Take 600 mg by mouth.    losartan (COZAAR) 25 MG tablet Take 25 mg by mouth.    [DISCONTINUED] meloxicam (MOBIC) 15 MG tablet Take 15 mg by mouth.    allopurinoL (ZYLOPRIM) 300 MG tablet Take 300 mg by mouth.    aspirin (ECOTRIN) 81 MG EC tablet Take 81 mg by mouth once daily.    meloxicam (MOBIC) 15 MG tablet Take 15 mg by mouth once daily.    metFORMIN (GLUCOPHAGE) 500 MG tablet Take 500 mg by mouth.    [DISCONTINUED] acyclovir (ZOVIRAX) 400 MG tablet Take 1 tablet (400 mg total) by mouth 5 (five) times daily. for 7 days (Patient not taking: Reported on 5/22/2024)     Family History    None       Tobacco Use    Smoking status: Never    Smokeless tobacco: Current     Types: Snuff   Substance and Sexual Activity    Alcohol use: Never    Drug use: Never    Sexual activity: Not on file     Review of Systems   Constitutional:  Positive for activity change. Negative for chills, fatigue and fever.   HENT:  Negative for sore throat.    Respiratory:  Negative for apnea, cough, choking, chest tightness, shortness of breath and wheezing.    Cardiovascular:  Negative for chest pain and palpitations.   Gastrointestinal:  Negative for abdominal pain, constipation, diarrhea, nausea and vomiting.   Genitourinary:  Negative for dysuria.   Musculoskeletal:  Positive for arthralgias.   Neurological:  Negative for dizziness and headaches.    Psychiatric/Behavioral:  Negative for agitation.      Objective:     Vital Signs (Most Recent):  Temp: 97.5 °F (36.4 °C) (05/28/24 2010)  Pulse: (!) 56 (05/28/24 2010)  Resp: 18 (05/28/24 2010)  BP: 121/64 (05/28/24 2010)  SpO2: (!) 93 % (05/28/24 2010) Vital Signs (24h Range):  Temp:  [97.5 °F (36.4 °C)-99.5 °F (37.5 °C)] 97.5 °F (36.4 °C)  Pulse:  [55-72] 56  Resp:  [12-18] 18  SpO2:  [91 %-99 %] 93 %  BP: (108-152)/(42-76) 121/64     Weight: 135.6 kg (299 lb)  Body mass index is 44.15 kg/m².     Physical Exam  Vitals and nursing note reviewed.   Constitutional:       Appearance: Normal appearance. He is obese.   HENT:      Mouth/Throat:      Mouth: Mucous membranes are moist.   Cardiovascular:      Rate and Rhythm: Normal rate and regular rhythm.      Pulses: Normal pulses.      Heart sounds: Normal heart sounds.   Pulmonary:      Breath sounds: Normal breath sounds.   Abdominal:      General: Bowel sounds are normal.      Palpations: Abdomen is soft.   Musculoskeletal:      Cervical back: Neck supple.      Right lower leg: No edema.      Left lower leg: No edema.      Comments: Dressing in situ in right knee.   Skin:     General: Skin is warm.      Capillary Refill: Capillary refill takes less than 2 seconds.   Neurological:      Mental Status: He is oriented to person, place, and time.   Psychiatric:         Mood and Affect: Mood normal.          Significant Labs: All pertinent labs within the past 24 hours have been reviewed.    Significant Imaging: I have reviewed all pertinent imaging results/findings within the past 24 hours.

## 2024-05-29 NOTE — HPI
Patient is a 59 y.o. male s/p Right total knee replacement for Primary osteoarthritis of right knee by Dr. Norris. Preop evaluation note reviewed of 5/22/24.  At time of exam, pt has no acute complaints or concerns. His pain is well controlled for now. Appetite is normal. Normal bowel and bladder habit.Pt  has a past medical history of Essential (primary) hypertension, Gout, arthritis, Mixed hyperlipidemia, and Type 2 diabetes mellitus without complication, without long-term current use of insulin (05/22/2024).  Home medications were reviewed and resumed. Will hold oral hypoglycemic meds for now. Started on Medium correctional dose Insulin.  This consult was performed under the direct supervision of Dr. Rankin. Thank you for allowing the Medicine team to be involved in the care of this patient.

## 2024-05-30 ENCOUNTER — TELEPHONE (OUTPATIENT)
Dept: ORTHOPEDICS | Facility: HOSPITAL | Age: 59
End: 2024-05-30
Payer: COMMERCIAL

## 2024-05-30 PROCEDURE — G0180 MD CERTIFICATION HHA PATIENT: HCPCS | Mod: ,,, | Performed by: ORTHOPAEDIC SURGERY

## 2024-06-04 ENCOUNTER — TELEPHONE (OUTPATIENT)
Dept: ORTHOPEDICS | Facility: HOSPITAL | Age: 59
End: 2024-06-04
Payer: COMMERCIAL

## 2024-06-11 DIAGNOSIS — M17.11 PRIMARY OSTEOARTHRITIS OF RIGHT KNEE: Primary | ICD-10-CM

## 2024-06-12 ENCOUNTER — HOSPITAL ENCOUNTER (OUTPATIENT)
Dept: RADIOLOGY | Facility: HOSPITAL | Age: 59
Discharge: HOME | End: 2024-06-12
Payer: COMMERCIAL

## 2024-06-12 ENCOUNTER — OFFICE VISIT (OUTPATIENT)
Dept: ORTHOPEDICS | Facility: CLINIC | Age: 59
End: 2024-06-12
Payer: COMMERCIAL

## 2024-06-12 DIAGNOSIS — M17.11 PRIMARY OSTEOARTHRITIS OF RIGHT KNEE: ICD-10-CM

## 2024-06-12 DIAGNOSIS — Z96.651 S/P TOTAL KNEE REPLACEMENT, RIGHT: Primary | ICD-10-CM

## 2024-06-12 PROCEDURE — 99212 OFFICE O/P EST SF 10 MIN: CPT | Mod: PBBFAC,25

## 2024-06-12 PROCEDURE — 73560 X-RAY EXAM OF KNEE 1 OR 2: CPT | Mod: 26,RT,, | Performed by: RADIOLOGY

## 2024-06-12 PROCEDURE — 3044F HG A1C LEVEL LT 7.0%: CPT | Mod: CPTII,,,

## 2024-06-12 PROCEDURE — 99024 POSTOP FOLLOW-UP VISIT: CPT | Mod: ,,,

## 2024-06-12 PROCEDURE — 73560 X-RAY EXAM OF KNEE 1 OR 2: CPT | Mod: TC,RT

## 2024-06-12 PROCEDURE — 99999 PR PBB SHADOW E&M-EST. PATIENT-LVL II: CPT | Mod: PBBFAC,,,

## 2024-06-12 PROCEDURE — 4010F ACE/ARB THERAPY RXD/TAKEN: CPT | Mod: CPTII,,,

## 2024-06-12 NOTE — PROGRESS NOTES
Arthroplasty, Knee, Total, Using Computer-assisted Navigation - Right 5/28/2024    Basilio Francis is a 59 y.o. male seen today for post-op visit.  Patient has a proximally 2 weeks status post right total knee arthroplasty by Dr. Norris.  Patient states he is doing well.  He is continued with home physical therapy however tomorrow is their last visit, and states he will need to start outpatient physical therapy.  Patient has staples already removed by home health.  He has no complaints today.      PAST MEDICAL HISTORY:   Past Medical History:   Diagnosis Date    Essential (primary) hypertension     Gout, arthritis     Mixed hyperlipidemia     Type 2 diabetes mellitus without complication, without long-term current use of insulin 05/22/2024        PAST SURGICAL HISTORY:   Past Surgical History:   Procedure Laterality Date    ARTHROPLASTY, KNEE, TOTAL, USING COMPUTER-ASSISTED NAVIGATION Right 5/28/2024    Procedure: ARTHROPLASTY, KNEE, TOTAL, USING COMPUTER-ASSISTED NAVIGATION;  Surgeon: David Norris MD;  Location: [facility];  Service: Orthopedics;  Laterality: Right;        MEDICATIONS:   Current Outpatient Medications:     allopurinoL (ZYLOPRIM) 300 MG tablet, Take 300 mg by mouth., Disp: , Rfl:     atorvastatin (LIPITOR) 10 MG tablet, Take 10 mg by mouth., Disp: , Rfl:     gemfibroziL (LOPID) 600 MG tablet, Take 600 mg by mouth., Disp: , Rfl:     losartan (COZAAR) 25 MG tablet, Take 25 mg by mouth., Disp: , Rfl:     metFORMIN (GLUCOPHAGE) 500 MG tablet, Take 500 mg by mouth., Disp: , Rfl:        PHYSICAL EXAM:      GENERAL: Well-developed, well-nourished male . The patient is alert, oriented and cooperative.    EXTREMITIES:  Right knee with MART hose in place, Steri-Strips overlying midline incision, minimal swelling and erythema around incision site, good range of motion lacking proximally 5° from full extension, flexion to 90°, knee is stable to varus and valgus stress  testing, neurovascularly intact    WOUND: Incisions are clean,dry,intact without signs of infection and healing well      RADIOGRAPHIC FINDINGS:   X-Ray Knee 1 or 2 View Right    Result Date: 6/12/2024  EXAMINATION: XR KNEE 1 OR 2 VIEW RIGHT CLINICAL HISTORY: Unilateral primary osteoarthritis, right knee COMPARISON: 28 May 2024 TECHNIQUE: XR KNEE 2 VIEW RIGHT FINDINGS: There has beenprevious right knee arthroplasty. Arthroplasty components appear normal alignment. No periprosthetic fractures seen. Expected postsurgical changes are present in the adjacent soft tissues.     Expected postsurgical appearance of right knee post-arthroplasty. Electronically signed by: Parish Nova Date:    06/12/2024 Time:    10:18    Patient Active Problem List    Diagnosis Date Noted    Essential (primary) hypertension     Mixed hyperlipidemia     Gout, arthritis     Type 2 diabetes mellitus without complication, without long-term current use of insulin 05/22/2024    Class 3 severe obesity with body mass index (BMI) of 45.0 to 49.9 in adult 05/22/2024    Primary osteoarthritis of right knee 05/06/2024     IMPRESSION AND PLAN: Patient is status-post Arthroplasty, Knee, Total, Using Computer-assisted Navigation - Right doing well.  Personally reviewed x-rays today showing total knee arthroplasty in place.  Discussed continuation of range of motion exercises at home.  Placed orders today for outpatient physical therapy.  Follow up with Dr. Norris in 4 weeks, sooner as needed.      No follow-ups on file.       Malka Lebron PA-C      (Subject to voice recognition error, transcription service not allowed)

## 2024-06-18 ENCOUNTER — CLINICAL SUPPORT (OUTPATIENT)
Dept: REHABILITATION | Facility: HOSPITAL | Age: 59
End: 2024-06-18
Payer: COMMERCIAL

## 2024-06-18 DIAGNOSIS — M25.561 ACUTE PAIN OF RIGHT KNEE: Primary | ICD-10-CM

## 2024-06-18 DIAGNOSIS — Z96.651 S/P TOTAL KNEE REPLACEMENT, RIGHT: ICD-10-CM

## 2024-06-18 PROCEDURE — 97161 PT EVAL LOW COMPLEX 20 MIN: CPT

## 2024-06-18 PROCEDURE — 97010 HOT OR COLD PACKS THERAPY: CPT

## 2024-06-18 PROCEDURE — 97110 THERAPEUTIC EXERCISES: CPT

## 2024-06-18 NOTE — PLAN OF CARE
"  [facility]   Physical Therapy Initial Evaluation      Name: Basilio Francis  Clinic Number: [id number]    Therapy Diagnosis:   Encounter Diagnosis   Name Primary?    S/P total knee replacement, right         Physician: Malka Lebron PA    Physician Orders: PT Eval and Treat   Medical Diagnosis from Referral: R TKA   Evaluation Date: 6/18/2024  Authorization Period Expiration: 6/12/2024  Plan of Care Expiration: 8/2/2024  Progress Note Due: 8/2/2024  Date of Surgery: 5/28/2024  Visit # / Visits authorized: 1/ 12   FOTO: to be completed on visit 6    Precautions: Standard     Time In: 13:25  Time Out: 14:16   Total Billable Time: 51 minutes (43 minutes billed  and 8 minutes not billed for ice)    Subjective     Date of onset: 5/28/2024     History of current condition - Bautista reports: R TKA on 5/28/2024 following about a year of R knee pain that was limiting his mobility. Patient reports that he received home health PT services for two weeks after surgery. He states that he is still having some difficulty getting his leg into and out of the bed and getting up and down from a chair.     Falls: None     Imaging: none: since surgery     Prior Therapy: Home health PT following surgery   Social History:  lives alone  Occupation: Retired   Prior Level of Function: Independent   Current Level of Function: Modified independent but unable to drive     Pain:  Current 5/10, worst 5/10, best 3/10   Location: right knee     Description: Aching and tight   Aggravating Factors: Getting out of bed/chair  Easing Factors: pain medication and ice    Patients goals: "get back going"     Medical History:   Past Medical History:   Diagnosis Date    Essential (primary) hypertension     Gout, arthritis     Mixed hyperlipidemia     Type 2 diabetes mellitus without complication, without long-term current use of insulin 05/22/2024       Surgical History:   Basilio Francis  has a past surgical history that " includes arthroplasty, knee, total, using computer-assisted navigation (Right, 5/28/2024).    Medications:   Basilio has a current medication list which includes the following prescription(s): allopurinol, atorvastatin, gemfibrozil, losartan, and metformin.    Allergies:   Review of patient's allergies indicates:  No Known Allergies     Objective    Incisions: Patient's incision is covered with steri strips at this time. There is no sign of drainage or infection.   Girth Measurements: Right 46.5 cm at midpatella vs.  Left 41.5 cm at midpatella    Range of motion:  Motion Right Left    Knee extension  -12 degrees   WITHIN FUNCTIONAL LIMITS   Knee flexion  91 degrees   108  degrees        Manual muscle test   Muscle Right  Left    Hip flexion  MMT strength: 3+/5  MMT strength: 4+/5   Hip extension  MMT strength: 3+/5  MMT strength: 4+/5   Hip abduction  MMT strength: 3+/5  MMT strength: 4+/5   Hip adduction  MMT strength: 3+/5  MMT strength: 4+/5   Knee extension  MMT strength: 2-/5  MMT strength: 4+/5   Knee flexion  MMT strength: 2-/5  MMT strength: 4+/5   Ankle DF  MMT strength: 4+/5  MMT strength: 4+/5   Ankle PF  MMT strength: 4+/5  MMT strength: 4+/5   Ankle inversion  MMT strength: 4+/5  MMT strength: 4+/5   Ankle eversion  MMT strength: 4+/5  MMT strength: 4+/5       Gait:  Weight bearing precautions: WBAT  Assistive device: rolling walker  Ambulation deviations: decreased R heel strike and toe off, decreased  R stance time, increased WB on assistive device, increased trunk flexion   Stairs: Patient negotiates up and down stairs with a non-reciprocal pattern using B handrails   Gait Speed: 1.80 feet per second       Clinical Special Tests:  5 x sit <>stand: unable without UE use       Intake Outcome Measure for FOTO  Survey    Therapist reviewed FOTO scores for Basilio Francis on 6/18/2024.   FOTO report - see Media section or FOTO account episode details.    Intake Score: 59%         Treatment     Total  "Treatment time (time-based codes) separate from Evaluation: 42 minutes (34 minutes billed and 8 minutes not billed for ice)     Bautista received the treatments listed below:      therapeutic exercises to develop strength, endurance, ROM, and flexibility for 34 minutes including:    Ther- Ex Reps    Nustep 5 minutes    Wedge 1 minute    Hamstring Stretch 2 x 20"   Heelslides (seated)  20 x (92 degrees)    LAQ's 20 x 3"    Hamstring Curls 20 x yellow TB    Quad sets  20 x 3"    SAQ's 20 x 3"    Straight Leg Raises 2 x 10 min A    Heel slides (supine)  20 x (91 degrees)                  cold pack for 8 minutes to R knee with elevation above heart level.    Patient Education and Home Exercises     Education provided:   - eval findings, plan of care, and Home exercise program     Written Home Exercises Provided: yes. Exercises were reviewed and Bautista was able to demonstrate them prior to the end of the session.  Bautista demonstrated good  understanding of the education provided. See EMR under Patient Instructions for exercises provided during therapy sessions.    Assessment     Basilio is a 59 y.o. male referred to outpatient Physical Therapy with a medical diagnosis of R TKA. Patient presents with R knee pain, decreased R knee ROM, decreased R LE muscle strength and motor control. Patient's impairments are currently limiting his activity tolerance as well as his safety and independence with functional mobility.     PT positioned patient properly on NuStep for increased R knee ROM and decreased joint stiffness prior to exercises. PT provided patient with moderate assistance to transfer onto and off of NuStep. He also required verbal and visual cueing for decreased hip hiking compensation on NuStep. PT also provided visual and verbal cueing throughout session for proper LE alignment, form, decreased compensations, increased R quad activation, and improved eccentric control with exercises. Patient had no reports of adverse " effects to treatment.     Patient prognosis is Good.   Patient will benefit from skilled outpatient Physical Therapy to address the deficits stated above and in the chart below, provide patient /family education, and to maximize patientt's level of independence.     Plan of care discussed with patient: Yes  Patient's spiritual, cultural and educational needs considered and patient is agreeable to the plan of care and goals as stated below:     Anticipated Barriers for therapy: compliance with Home exercise program     Medical Necessity is demonstrated by the following  History  Co-morbidities and personal factors that may impact the plan of care [x] LOW: no personal factors / co-morbidities  [] MODERATE: 1-2 personal factors / co-morbidities  [] HIGH: 3+ personal factors / co-morbidities    Moderate / High Support Documentation:   Co-morbidities affecting plan of care: NA    Personal Factors:   no deficits     Examination  Body Structures and Functions, activity limitations and participation restrictions that may impact the plan of care [x] LOW: addressing 1-2 elements  [] MODERATE: 3+ elements  [] HIGH: 4+ elements (please support below)    Moderate / High Support Documentation: NA     Clinical Presentation [x] LOW: stable  [] MODERATE: Evolving  [] HIGH: Unstable     Decision Making/ Complexity Score: low       Goals:  Short Term Goals: 3 weeks   Patient will independently complete home exercise program with correct form.   Patient will ambulate independently with straight cane for increased independence with community ambulation.   Patient will complete sit to stand transfer with no UE use for increased independence with functional transfers.     Long Term Goals: 6 weeks   Pt will increase R knee flexion to 110, knee extension to 0 degrees, and quad lag to 0 degrees to allow patient normal gait pattern.  Pt will increase R knee strength to 4/5 to allow patient to safely return to prior level of function   Pt will  report decrease in pain to less than or equal to 2/10 to improve quality of life  Pt will ambulate with increased gait speed to greater than or equal to 2.25 feet per second for increased safety with community ambulation.   Pt will negotiate up and down stairs with a reciprocal gait pattern for increased safety with community mobility.     Plan     Plan of care Certification: 6/18/2024 to 8/2/2024.    Outpatient Physical Therapy 2 times weekly for 6 weeks to include the following interventions: Gait Training, Manual Therapy, Moist Heat/ Ice, Neuromuscular Re-ed, Patient Education, Therapeutic Activities, and Therapeutic Exercise.     Chirag May, PT, DPT         Physician's Signature: _________________________________________ Date: ________________

## 2024-06-20 ENCOUNTER — CLINICAL SUPPORT (OUTPATIENT)
Dept: REHABILITATION | Facility: HOSPITAL | Age: 59
End: 2024-06-20
Payer: COMMERCIAL

## 2024-06-20 DIAGNOSIS — Z96.651 S/P TOTAL KNEE REPLACEMENT, RIGHT: Primary | ICD-10-CM

## 2024-06-20 PROCEDURE — 97010 HOT OR COLD PACKS THERAPY: CPT | Mod: CQ

## 2024-06-20 PROCEDURE — 97110 THERAPEUTIC EXERCISES: CPT | Mod: CQ

## 2024-06-20 NOTE — PROGRESS NOTES
"[facility]   Physical Therapy Treatment Note      Name: Basilio Francis  [facility] Number: [id number]    Therapy Diagnosis: No diagnosis found.  Physician: Malka Lebron PA    Visit Date: 6/20/2024      Physician Orders: PT Eval and Treat   Medical Diagnosis from Referral: R TKA   Evaluation Date: 6/18/2024  Authorization Period Expiration: 6/12/2024  Plan of Care Expiration: 8/2/2024  Progress Note Due: 8/2/2024  Date of Surgery: 5/28/2024  Visit # / Visits authorized: 2/ 12   FOTO: to be completed on visit 6     Precautions: Standard      Time In: 12:30  Time Out: 13:12   Total Billable Time: 42 minutes (34 minutes billed  and 8 minutes not billed for ice)    PTA Visit #: 1/5       Subjective     Patient reports: "I feel alright. I only have a little pain."  He was compliant with home exercise program.  Response to previous treatment: Post tx soreness  Functional change: Too early in Plan of Care     Pain: 3/10  Location: right knee      Objective      Objective Measures updated at progress report unless specified.     Treatment     Bautista received the treatments listed below:      therapeutic exercises to develop strength, endurance, ROM, and flexibility for 34 minutes including:     Ther- Ex Reps    Nustep 5 minutes    Wedge 1 minute    Hamstring Stretch 2 x 20"   Heelslides (seated)  20 x (92 degrees)    LAQ's 20 x 3"    Hamstring Curls 20 x yellow TB    Quad sets  20 x 3"    SAQ's 20 x 3"    Straight Leg Raises 2 x 10 min A    Heel slides (supine)  20 x (91 degrees)                         cold pack for 8 minutes to R knee with elevation above heart level.    Patient Education and Home Exercises       Education provided:   - Plan of Care, Home exercise program, Delayed onset muscle soreness     Written Home Exercises Provided: Patient instructed to cont prior HEP. Exercises were reviewed and Bautista was able to demonstrate them prior to the end of the session.  Bautista demonstrated good  " understanding of the education provided. See Electronic Medical Record under Patient Instructions for exercises provided during therapy sessions    Assessment   Basilio is a 59 y.o. male referred to outpatient Physical Therapy with a medical diagnosis of R TKA. Patient presents with R knee pain, decreased R knee ROM, decreased R LE muscle strength and motor control. Patient's impairments are currently limiting his activity tolerance as well as his safety and independence with functional mobility. LPTA provided pt with proper setup on NuStep to decrease stiffness and improve right knee range of motion prior to exercises. Pt provided verbal cues to decrease hip hiking to improve effectiveness of NuStep. Pt displays significant quad weakness with Long Arc Quad's and Short Arc Quads. Pt required verbal and visual cues for progression with proper form, count, hold times, and decreased compensations. No adverse effects noted or reported.     Patient prognosis is Good.   Patient will benefit from skilled outpatient Physical Therapy to address the deficits stated above and in the chart below, provide patient /family education, and to maximize patientt's level of independence.      Plan of care discussed with patient: Yes  Patient's spiritual, cultural and educational needs considered and patient is agreeable to the plan of care and goals as stated below:      Anticipated Barriers for therapy: compliance with Home exercise program    Goals:  Short Term Goals: 3 weeks   Patient will independently complete home exercise program with correct form.   Patient will ambulate independently with straight cane for increased independence with community ambulation.   Patient will complete sit to stand transfer with no UE use for increased independence with functional transfers.      Long Term Goals: 6 weeks   Pt will increase R knee flexion to 110, knee extension to 0 degrees, and quad lag to 0 degrees to allow patient normal gait  pattern.  Pt will increase R knee strength to 4/5 to allow patient to safely return to prior level of function   Pt will report decrease in pain to less than or equal to 2/10 to improve quality of life  Pt will ambulate with increased gait speed to greater than or equal to 2.25 feet per second for increased safety with community ambulation.   Pt will negotiate up and down stairs with a reciprocal gait pattern for increased safety with community mobility.     Plan   Plan of care Certification: 6/18/2024 to 8/2/2024.     Outpatient Physical Therapy 2 times weekly for 6 weeks to include the following interventions: Gait Training, Manual Therapy, Moist Heat/ Ice, Neuromuscular Re-ed, Patient Education, Therapeutic Activities, and Therapeutic Exercise.     Continue POC per PT orders to progress patient toward rehab goals.    DEIDRA Carty  6/20/2024

## 2024-06-24 ENCOUNTER — TELEPHONE (OUTPATIENT)
Dept: ORTHOPEDICS | Facility: CLINIC | Age: 59
End: 2024-06-24
Payer: COMMERCIAL

## 2024-06-24 RX ORDER — HYDROCODONE BITARTRATE AND ACETAMINOPHEN 7.5; 325 MG/1; MG/1
1 TABLET ORAL EVERY 6 HOURS PRN
Qty: 28 TABLET | Refills: 0 | Status: SHIPPED | OUTPATIENT
Start: 2024-06-24 | End: 2024-07-02

## 2024-06-24 NOTE — TELEPHONE ENCOUNTER
----- Message from Delisa Evans sent at 6/24/2024 10:05 AM CDT -----  Who Called: Basilio Francis  Refill or New Rx:Refill PAIN MEDICINE  Ordering Provider:ZULEYMA ARSHAD   Preferred Method of Contact: Phone Call  Patient's Preferred Phone Number on File: [phone]  Best Call Back Number, if different:  Additional Information: NEED REFILL ON PAIN MEDICINE

## 2024-06-25 ENCOUNTER — CLINICAL SUPPORT (OUTPATIENT)
Dept: REHABILITATION | Facility: HOSPITAL | Age: 59
End: 2024-06-25
Payer: COMMERCIAL

## 2024-06-25 DIAGNOSIS — M25.561 ACUTE PAIN OF RIGHT KNEE: Primary | ICD-10-CM

## 2024-06-25 PROCEDURE — 97110 THERAPEUTIC EXERCISES: CPT

## 2024-06-25 NOTE — PROGRESS NOTES
"[facility]   Physical Therapy Treatment Note      Name: Basilio Francis  Clinic Number: [id number]    Therapy Diagnosis:   Encounter Diagnosis   Name Primary?    Acute pain of right knee Yes     Physician: Malka Lebron PA    Visit Date: 6/25/2024      Physician Orders: PT Eval and Treat   Medical Diagnosis from Referral: R TKA   Evaluation Date: 6/18/2024  Authorization Period Expiration: 6/12/2024  Plan of Care Expiration: 8/2/2024  Progress Note Due: 8/2/2024  Date of Surgery: 5/28/2024  Visit # / Visits authorized: 3/12   FOTO: to be completed on visit 6     Precautions: Standard      Time In: 12:31  Time Out: 13:20  Total Billable Time: 51 minutes (43 minutes billed  and 8 minutes not billed for ice)    PTA Visit #: 0/5   Subjective     Patient reports: "It's not hurting."  He was compliant with home exercise program.  Response to previous treatment: Post tx soreness  Functional change: Too early in Plan of Care     Pain: 0/10  Location: right knee      Objective      Objective Measures updated at progress report unless specified.     Treatment     Bautista received the treatments listed below:      therapeutic exercises to develop strength, endurance, ROM, and flexibility for 43 minutes including:     Ther- Ex Reps    Bike Rocks  8 minutes    Wedge 2 minute    Hamstring Stretch 2 x 20"   Heelslides (seated)  20 x (96 degrees)    LAQ's 20 x 3"    Hamstring Curls 20 x yellow TB    Quad sets  30 x 3" *   SAQ's 20 x 3"    Straight Leg Raises 2 x 10 min A    Heel slides (supine)  20 x (92 degrees)           Therapeutic Activities Reps    Heel Raises  2 x 10    Standing Hip and knee Flexion  2 x 10 R       cold pack for 8 minutes to R knee with elevation above heart level.    Patient Education and Home Exercises       Education provided:   - Plan of Care, Home exercise program, Delayed onset muscle soreness     Written Home Exercises Provided: Patient instructed to cont prior HEP. " Exercises were reviewed and Bautista was able to demonstrate them prior to the end of the session.  Bautista demonstrated good  understanding of the education provided. See Electronic Medical Record under Patient Instructions for exercises provided during therapy sessions    Assessment   Basilio is a 59 y.o. male referred to outpatient Physical Therapy with a medical diagnosis of R TKA. Patient presents with R knee pain, decreased R knee ROM, decreased R LE muscle strength and motor control. Patient's impairments are currently limiting his activity tolerance as well as his safety and independence with functional mobility. PT provided pt with proper positioning on rec bike and instructed patient in completion of bike rocks due to inability to complete full revolution. Patient required moderate visual, verbal, and tactile cueing throughout treatment for proper LE alignment, speed of movement, increased eccentric control, and decreased compensations with exercises. Patient had no reports of adverse effects to treatment tasks.      Patient prognosis is Good.   Patient will benefit from skilled outpatient Physical Therapy to address the deficits stated above and in the chart below, provide patient /family education, and to maximize patientt's level of independence.      Plan of care discussed with patient: Yes  Patient's spiritual, cultural and educational needs considered and patient is agreeable to the plan of care and goals as stated below:      Anticipated Barriers for therapy: compliance with Home exercise program    Goals:  Short Term Goals: 3 weeks   Patient will independently complete home exercise program with correct form.   Patient will ambulate independently with straight cane for increased independence with community ambulation.   Patient will complete sit to stand transfer with no UE use for increased independence with functional transfers.      Long Term Goals: 6 weeks   Pt will increase R knee flexion to 110,  knee extension to 0 degrees, and quad lag to 0 degrees to allow patient normal gait pattern.  Pt will increase R knee strength to 4/5 to allow patient to safely return to prior level of function   Pt will report decrease in pain to less than or equal to 2/10 to improve quality of life  Pt will ambulate with increased gait speed to greater than or equal to 2.25 feet per second for increased safety with community ambulation.   Pt will negotiate up and down stairs with a reciprocal gait pattern for increased safety with community mobility.     Plan   Plan of care Certification: 6/18/2024 to 8/2/2024.     Outpatient Physical Therapy 2 times weekly for 6 weeks to include the following interventions: Gait Training, Manual Therapy, Moist Heat/ Ice, Neuromuscular Re-ed, Patient Education, Therapeutic Activities, and Therapeutic Exercise.     Continue POC per PT orders to progress patient toward rehab goals.    Chirag May, PT, DPT     6/25/2024

## 2024-06-27 ENCOUNTER — CLINICAL SUPPORT (OUTPATIENT)
Dept: REHABILITATION | Facility: HOSPITAL | Age: 59
End: 2024-06-27
Payer: COMMERCIAL

## 2024-06-27 DIAGNOSIS — M25.561 ACUTE PAIN OF RIGHT KNEE: Primary | ICD-10-CM

## 2024-06-27 PROCEDURE — 97110 THERAPEUTIC EXERCISES: CPT | Mod: CQ

## 2024-06-27 PROCEDURE — 97010 HOT OR COLD PACKS THERAPY: CPT | Mod: CQ

## 2024-06-27 NOTE — PROGRESS NOTES
"[facility]   Physical Therapy Treatment Note      Name: Basilio Francis  Clinic Number: [id number]    Therapy Diagnosis:   Encounter Diagnosis   Name Primary?    Acute pain of right knee Yes     Physician: Malka Lebron PA    Visit Date: 6/27/2024      Physician Orders: PT Eval and Treat   Medical Diagnosis from Referral: R TKA   Evaluation Date: 6/18/2024  Authorization Period Expiration: 6/12/2024  Plan of Care Expiration: 8/2/2024  Progress Note Due: 8/2/2024  Date of Surgery: 5/28/2024  Visit # / Visits authorized: 4/12   FOTO: to be completed on visit 6     Precautions: Standard      Time In: 12:39  Time Out: 13:22  Total Billable Time: 43 minutes (35 minutes billed  and 8 minutes not billed for ice)    PTA Visit #: 1/5   Subjective     Patient reports: "It's not hurting."  He was compliant with home exercise program.  Response to previous treatment: Post tx soreness  Functional change: Too early in Plan of Care     Pain: 0/10  Location: right knee      Objective      Objective Measures updated at progress report unless specified.     Treatment     Bautista received the treatments listed below:      therapeutic exercises to develop strength, endurance, ROM, and flexibility for 35 minutes including:     Ther- Ex Reps    Bike Rocks  8 minutes    Wedge 2 minute    Hamstring Stretch 2 x 20"   Heelslides (seated)  20 x (96 degrees)    LAQ's 20 x 3"    Hamstring Curls 20 x yellow TB    Quad sets  30 x 3" *   SAQ's 20 x 3"    Straight Leg Raises 2 x 10 min A    Heel slides (supine)  20 x (92 degrees)           Therapeutic Activities Reps    Heel Raises  2 x 10    Standing Hip and knee Flexion  2 x 10 R       cold pack for 8 minutes to R knee with elevation above heart level.    Patient Education and Home Exercises       Education provided:   - Plan of Care, Home exercise program, Delayed onset muscle soreness     Written Home Exercises Provided: Patient instructed to cont prior HEP. " Exercises were reviewed and Bautista was able to demonstrate them prior to the end of the session.  Bautista demonstrated good  understanding of the education provided. See Electronic Medical Record under Patient Instructions for exercises provided during therapy sessions    Assessment   Basilio is a 59 y.o. male referred to outpatient Physical Therapy with a medical diagnosis of R TKA. Patient presents with R knee pain, decreased R knee ROM, decreased R LE muscle strength and motor control. Patient's impairments are currently limiting his activity tolerance as well as his safety and independence with functional mobility. LPTA provided pt with proper positioning on NuStep to decrease stiffness and improve right knee range of motion. Pt required occasional visual, verbal, and tactile cueing throughout tx for proper LE alignment, speed of movement, increased eccentric control, and decreased compensations with exercises. Pt displayed improved strength throughout tx. No adverse effects noted.     Patient prognosis is Good.   Patient will benefit from skilled outpatient Physical Therapy to address the deficits stated above and in the chart below, provide patient /family education, and to maximize patientt's level of independence.      Plan of care discussed with patient: Yes  Patient's spiritual, cultural and educational needs considered and patient is agreeable to the plan of care and goals as stated below:      Anticipated Barriers for therapy: compliance with Home exercise program    Goals:  Short Term Goals: 3 weeks   Patient will independently complete home exercise program with correct form.   Patient will ambulate independently with straight cane for increased independence with community ambulation.   Patient will complete sit to stand transfer with no UE use for increased independence with functional transfers.      Long Term Goals: 6 weeks   Pt will increase R knee flexion to 110, knee extension to 0 degrees, and quad  lag to 0 degrees to allow patient normal gait pattern.  Pt will increase R knee strength to 4/5 to allow patient to safely return to prior level of function   Pt will report decrease in pain to less than or equal to 2/10 to improve quality of life  Pt will ambulate with increased gait speed to greater than or equal to 2.25 feet per second for increased safety with community ambulation.   Pt will negotiate up and down stairs with a reciprocal gait pattern for increased safety with community mobility.     Plan   Plan of care Certification: 6/18/2024 to 8/2/2024.     Outpatient Physical Therapy 2 times weekly for 6 weeks to include the following interventions: Gait Training, Manual Therapy, Moist Heat/ Ice, Neuromuscular Re-ed, Patient Education, Therapeutic Activities, and Therapeutic Exercise.     Continue POC per PT orders to progress patient toward rehab goals.    DEIDRA Carty  6/27/2024

## 2024-07-01 ENCOUNTER — CLINICAL SUPPORT (OUTPATIENT)
Dept: REHABILITATION | Facility: HOSPITAL | Age: 59
End: 2024-07-01
Payer: COMMERCIAL

## 2024-07-01 DIAGNOSIS — M25.561 ACUTE PAIN OF RIGHT KNEE: Primary | ICD-10-CM

## 2024-07-01 PROCEDURE — 97112 NEUROMUSCULAR REEDUCATION: CPT

## 2024-07-01 PROCEDURE — 97140 MANUAL THERAPY 1/> REGIONS: CPT

## 2024-07-01 PROCEDURE — 97110 THERAPEUTIC EXERCISES: CPT

## 2024-07-03 ENCOUNTER — CLINICAL SUPPORT (OUTPATIENT)
Dept: REHABILITATION | Facility: HOSPITAL | Age: 59
End: 2024-07-03
Payer: COMMERCIAL

## 2024-07-03 DIAGNOSIS — M25.561 ACUTE PAIN OF RIGHT KNEE: Primary | ICD-10-CM

## 2024-07-03 PROCEDURE — 97110 THERAPEUTIC EXERCISES: CPT

## 2024-07-03 PROCEDURE — 97112 NEUROMUSCULAR REEDUCATION: CPT

## 2024-07-03 NOTE — PROGRESS NOTES
"[facility]   Physical Therapy Treatment Note      Name: Basilio Francis  [facility] Number: [id number]    Therapy Diagnosis:   Encounter Diagnosis   Name Primary?    Acute pain of right knee Yes       Physician: Malka Lebron PA    Visit Date: 7/3/2024    Physician Orders: PT Eval and Treat   Medical Diagnosis from Referral: R TKA   Evaluation Date: 6/18/2024  Authorization Period Expiration: 6/12/2024  Plan of Care Expiration: 8/2/2024  Progress Note Due: 8/2/2024  Date of Surgery: 5/28/2024  Visit # / Visits authorized: 6/12   FOTO: to be completed on visit 7     Precautions: Standard      Time In: 13:14  Time Out: 13:58  Total Billable Time: 44 minutes (6 minutes not billed for ice)     PTA Visit #: 0/5   Subjective     Patient reports: "I was a little sore last time."   He was compliant with home exercise program.  Response to previous treatment: no adverse effects  Functional change: Too early in Plan of Care     Pain: 0/10  Location: right knee      Objective      Objective Measures updated at progress report unless specified.     Treatment     Bautista received the treatments listed below:      Therapeutic exercise total time: 30 minutes. See flowsheet below.     Ther- Ex Reps    Nutstep 8 minutes    Wedge 3x30 sec   LAQ's  20 x 3"    Hamstring Curls  20 x yellow TB    Quad sets  3 x 10   Straight Leg Raises 3 x 10   Heel slides  23 x (94 degrees)    Heel Raises  2 x 10    Standing Hip and knee Flexion  2 x 10 R    Knee flex stretch 3x30 sec   Hamstring Stretch  3x30 sec      Neuromuscular reeducation total time: 8 minutes. See flowsheet below.     Neuro re-ed activity Performed today Reps/Sets/Hold time   SAQ [x]Yes    []No 30 x 3 sec hold   Prone QS [x]Yes    []No 10 x 10 sec   QS shift [x]Yes    []No 10 x 10 sec    []Yes    []No     []Yes    []No      Manual therapy total time: 0 minutes. See flowsheet below.     Manual therapy technique Performed today   Knee flex with IR at EOB " []Yes    [x]No   Knee ext OP with superior patellar slide []Yes    [x]No    []Yes    []No    []Yes    []No    []Yes    []No     Therapeutic activity total time: 0 minutes. See flowsheet below.    Therapeutic activity Performed today Reps/Sets/Hold time    []Yes    []No     []Yes    []No     []Yes    []No     []Yes    []No     []Yes    []No      Therapeutic modalities total time: 0 minutes. See flowsheet below.    Modality  Performed today Parameters    []Yes    []No     []Yes    []No     []Yes    []No     []Yes    []No     []Yes    []No      Cold pack applied to R knee with elevation above heart level for 6 minutes.   Patient Education and Home Exercises       Education provided:   - Plan of Care, Home exercise program, Delayed onset muscle soreness     Written Home Exercises Provided: Patient instructed to cont prior HEP. Exercises were reviewed and Bautista was able to demonstrate them prior to the end of the session.  Bautista demonstrated good  understanding of the education provided. See Electronic Medical Record under Patient Instructions for exercises provided during therapy sessions    Assessment   Basilio is a 59 y.o. male referred to outpatient Physical Therapy with a medical diagnosis of R TKA. Patient presents with R knee pain, decreased R knee ROM, decreased R LE muscle strength and motor control. Patient's impairments are currently limiting his activity tolerance as well as his safety and independence with functional mobility. Patient continues to require moderate cues for proper alignment, form, speed, and decreased compensations during R LE exercises. Patient with continued quad lag with straight leg raises exercises requiring moderate tactile cues to increase quad activation. Patient had no reports of adverse effects to treatment tasks.   Patient prognosis is Good.   Patient will benefit from skilled outpatient Physical Therapy to address the deficits stated above and in the chart below, provide patient  /family education, and to maximize patientt's level of independence.      Plan of care discussed with patient: Yes  Patient's spiritual, cultural and educational needs considered and patient is agreeable to the plan of care and goals as stated below:      Anticipated Barriers for therapy: compliance with Home exercise program    Goals:  Short Term Goals: 3 weeks   Patient will independently complete home exercise program with correct form.   Patient will ambulate independently with straight cane for increased independence with community ambulation.   Patient will complete sit to stand transfer with no UE use for increased independence with functional transfers.      Long Term Goals: 6 weeks   Pt will increase R knee flexion to 110, knee extension to 0 degrees, and quad lag to 0 degrees to allow patient normal gait pattern.  Pt will increase R knee strength to 4/5 to allow patient to safely return to prior level of function   Pt will report decrease in pain to less than or equal to 2/10 to improve quality of life  Pt will ambulate with increased gait speed to greater than or equal to 2.25 feet per second for increased safety with community ambulation.   Pt will negotiate up and down stairs with a reciprocal gait pattern for increased safety with community mobility.     Plan   Plan of care Certification: 6/18/2024 to 8/2/2024.     Outpatient Physical Therapy 2 times weekly for 6 weeks to include the following interventions: Gait Training, Manual Therapy, Moist Heat/ Ice, Neuromuscular Re-ed, Patient Education, Therapeutic Activities, and Therapeutic Exercise.     Continue POC per PT orders to progress patient toward rehab goals.    Chirag May, PT, DPT      7/3/2024

## 2024-07-05 DIAGNOSIS — Z96.651 S/P TOTAL KNEE REPLACEMENT, RIGHT: Primary | ICD-10-CM

## 2024-07-09 ENCOUNTER — CLINICAL SUPPORT (OUTPATIENT)
Dept: REHABILITATION | Facility: HOSPITAL | Age: 59
End: 2024-07-09
Payer: COMMERCIAL

## 2024-07-09 DIAGNOSIS — M25.561 ACUTE PAIN OF RIGHT KNEE: Primary | ICD-10-CM

## 2024-07-09 PROCEDURE — 97110 THERAPEUTIC EXERCISES: CPT | Mod: CQ

## 2024-07-09 PROCEDURE — 97010 HOT OR COLD PACKS THERAPY: CPT | Mod: CQ

## 2024-07-09 PROCEDURE — 97112 NEUROMUSCULAR REEDUCATION: CPT | Mod: CQ

## 2024-07-09 NOTE — PROGRESS NOTES
"[facility]   Physical Therapy Treatment Note      Name: Basilio Francis  [facility] Number: [id number]    Therapy Diagnosis:   No diagnosis found.      Physician: Malka Lebron PA    Visit Date: 7/9/2024    Physician Orders: PT Eval and Treat   Medical Diagnosis from Referral: R TKA   Evaluation Date: 6/18/2024  Authorization Period Expiration: 6/12/2024  Plan of Care Expiration: 8/2/2024  Progress Note Due: 8/2/2024  Date of Surgery: 5/28/2024  Visit # / Visits authorized: 7/12   FOTO: to be completed on visit 7     Precautions: Standard      Time In: 14:37  Time Out: 15:30  Total Billable Time: 53 minutes (6 minutes not billed for ice)     PTA Visit #: 1/5   Subjective     Patient reports: "I go to the MD tomorrow; hopefully he takes me off of my walker."   He was compliant with home exercise program.  Response to previous treatment: no adverse effects  Functional change: Too early in Plan of Care     Pain: 0/10  Location: right knee      Objective      Objective Measures updated at progress report unless specified.     Treatment     Bautista received the treatments listed below:      Therapeutic exercise total time: 39 minutes. See flowsheet below.     Ther- Ex Reps    Nutstep 8 minutes    Wedge 3x30 sec   LAQ's  20 x 3"    Hamstring Curls  20 x yellow TB    Quad sets  3 x 10   Straight Leg Raises 3 x 10   Heel slides  23 x (94 degrees)    Heel Raises  2 x 10    Standing Hip and knee Flexion  2 x 10 R    Knee flex stretch 3x30 sec   Hamstring Stretch  3x30 sec      Neuromuscular reeducation total time: 8 minutes. See flowsheet below.     Neuro re-ed activity Performed today Reps/Sets/Hold time   SAQ [x]Yes    []No 30 x 3 sec hold   Prone QS [x]Yes    []No 10 x 10 sec   QS shift [x]Yes    []No 10 x 10 sec    []Yes    []No     []Yes    []No      Manual therapy total time: 0 minutes. See flowsheet below.     Manual therapy technique Performed today   Knee flex with IR at EOB []Yes    [x]No "   Knee ext OP with superior patellar slide []Yes    [x]No    []Yes    []No    []Yes    []No    []Yes    []No     Therapeutic activity total time: 0 minutes. See flowsheet below.    Therapeutic activity Performed today Reps/Sets/Hold time    []Yes    []No     []Yes    []No     []Yes    []No     []Yes    []No     []Yes    []No      Therapeutic modalities total time: 0 minutes. See flowsheet below.    Modality  Performed today Parameters    []Yes    []No     []Yes    []No     []Yes    []No     []Yes    []No     []Yes    []No      Cold pack applied to R knee with elevation above heart level for 6 minutes.   Patient Education and Home Exercises       Education provided:   - Plan of Care, Home exercise program, Delayed onset muscle soreness     Written Home Exercises Provided: Patient instructed to cont prior HEP. Exercises were reviewed and Bautista was able to demonstrate them prior to the end of the session.  Bautista demonstrated good  understanding of the education provided. See Electronic Medical Record under Patient Instructions for exercises provided during therapy sessions    Assessment   Basilio is a 59 y.o. male referred to outpatient Physical Therapy with a medical diagnosis of R TKA. Patient presents with R knee pain, decreased R knee ROM, decreased R LE muscle strength and motor control. Patient's impairments are currently limiting his activity tolerance as well as his safety and independence with functional mobility. Pt continues to require moderate cues for proper alignment, form, speed, and decreased compensations during R LE exercises. Pt knee flexion range of motion slightly decreased secondary to post exercise soreness.  No adverse effects noted.    Patient prognosis is Good.   Patient will benefit from skilled outpatient Physical Therapy to address the deficits stated above and in the chart below, provide patient /family education, and to maximize patientt's level of independence.      Plan of care discussed  with patient: Yes  Patient's spiritual, cultural and educational needs considered and patient is agreeable to the plan of care and goals as stated below:      Anticipated Barriers for therapy: compliance with Home exercise program    Goals:  Short Term Goals: 3 weeks   Patient will independently complete home exercise program with correct form.   Patient will ambulate independently with straight cane for increased independence with community ambulation.   Patient will complete sit to stand transfer with no UE use for increased independence with functional transfers.      Long Term Goals: 6 weeks   Pt will increase R knee flexion to 110, knee extension to 0 degrees, and quad lag to 0 degrees to allow patient normal gait pattern.  Pt will increase R knee strength to 4/5 to allow patient to safely return to prior level of function   Pt will report decrease in pain to less than or equal to 2/10 to improve quality of life  Pt will ambulate with increased gait speed to greater than or equal to 2.25 feet per second for increased safety with community ambulation.   Pt will negotiate up and down stairs with a reciprocal gait pattern for increased safety with community mobility.     Plan   Plan of care Certification: 6/18/2024 to 8/2/2024.     Outpatient Physical Therapy 2 times weekly for 6 weeks to include the following interventions: Gait Training, Manual Therapy, Moist Heat/ Ice, Neuromuscular Re-ed, Patient Education, Therapeutic Activities, and Therapeutic Exercise.     Continue POC per PT orders to progress patient toward rehab goals.    DEIDRA Carty  7/9/2024

## 2024-07-10 ENCOUNTER — HOSPITAL ENCOUNTER (OUTPATIENT)
Dept: RADIOLOGY | Facility: HOSPITAL | Age: 59
Discharge: HOME | End: 2024-07-10
Attending: ORTHOPAEDIC SURGERY
Payer: COMMERCIAL

## 2024-07-10 ENCOUNTER — OFFICE VISIT (OUTPATIENT)
Dept: ORTHOPEDICS | Facility: CLINIC | Age: 59
End: 2024-07-10
Payer: COMMERCIAL

## 2024-07-10 DIAGNOSIS — Z96.651 S/P TOTAL KNEE REPLACEMENT, RIGHT: ICD-10-CM

## 2024-07-10 DIAGNOSIS — Z96.651 S/P TKR (TOTAL KNEE REPLACEMENT), RIGHT: Primary | ICD-10-CM

## 2024-07-10 PROCEDURE — 73562 X-RAY EXAM OF KNEE 3: CPT | Mod: TC,RT

## 2024-07-10 PROCEDURE — 73562 X-RAY EXAM OF KNEE 3: CPT | Mod: 26,RT,, | Performed by: ORTHOPAEDIC SURGERY

## 2024-07-10 PROCEDURE — 99213 OFFICE O/P EST LOW 20 MIN: CPT | Mod: PBBFAC,25 | Performed by: ORTHOPAEDIC SURGERY

## 2024-07-10 PROCEDURE — 99999 PR PBB SHADOW E&M-EST. PATIENT-LVL III: CPT | Mod: PBBFAC,,, | Performed by: ORTHOPAEDIC SURGERY

## 2024-07-10 PROCEDURE — 1160F RVW MEDS BY RX/DR IN RCRD: CPT | Mod: CPTII,,, | Performed by: ORTHOPAEDIC SURGERY

## 2024-07-10 PROCEDURE — 99024 POSTOP FOLLOW-UP VISIT: CPT | Mod: ,,, | Performed by: ORTHOPAEDIC SURGERY

## 2024-07-10 PROCEDURE — 1159F MED LIST DOCD IN RCRD: CPT | Mod: CPTII,,, | Performed by: ORTHOPAEDIC SURGERY

## 2024-07-10 PROCEDURE — 4010F ACE/ARB THERAPY RXD/TAKEN: CPT | Mod: CPTII,,, | Performed by: ORTHOPAEDIC SURGERY

## 2024-07-10 PROCEDURE — 3044F HG A1C LEVEL LT 7.0%: CPT | Mod: CPTII,,, | Performed by: ORTHOPAEDIC SURGERY

## 2024-07-10 NOTE — PROGRESS NOTES
CLINIC NOTE       Chief Complaint   Patient presents with    Right Knee - Follow-up        Basilio Francis is a 59 y.o. male seen today for recheck of his right knee.  He was now 6 weeks following right TKR.  He was doing very well at this time.  He admits to independent ambulation in the house.  He was had good relief of preoperative weight-bearing pain symptoms.  Completing his physical therapy efforts restore motion and doing well.      Past Medical History:   Diagnosis Date    Essential (primary) hypertension     Gout, arthritis     Mixed hyperlipidemia     Type 2 diabetes mellitus without complication, without long-term current use of insulin 05/22/2024     No family history on file.  Current Outpatient Medications on File Prior to Visit   Medication Sig Dispense Refill    allopurinoL (ZYLOPRIM) 300 MG tablet Take 300 mg by mouth.      atorvastatin (LIPITOR) 10 MG tablet Take 10 mg by mouth.      gemfibroziL (LOPID) 600 MG tablet Take 600 mg by mouth.      losartan (COZAAR) 25 MG tablet Take 25 mg by mouth.      metFORMIN (GLUCOPHAGE) 500 MG tablet Take 500 mg by mouth.       No current facility-administered medications on file prior to visit.       ROS     There were no vitals filed for this visit.    Past Surgical History:   Procedure Laterality Date    ARTHROPLASTY, KNEE, TOTAL, USING COMPUTER-ASSISTED NAVIGATION Right 5/28/2024    Procedure: ARTHROPLASTY, KNEE, TOTAL, USING COMPUTER-ASSISTED NAVIGATION;  Surgeon: David Norris MD;  Location: [facility];  Service: Orthopedics;  Laterality: Right;        Review of patient's allergies indicates:  No Known Allergies     Ortho Exam : Right knee incisions healing well without signs of infection.  Knee motion 0-100 degrees of flexion.    Radiographic Examination:  Right knee 07/10/2024    Technique:  Three views AP lateral and patellar    Findings:  Bones well mineralized.  Metallic Arthrex components are in expected position alignment for  TKR.  Patella seated in midline sulcus on sunrise view.    Impression:   See Above    Assessment and Plan  Patient Active Problem List    Diagnosis Date Noted    Acute pain of right knee 06/18/2024    Essential (primary) hypertension     Mixed hyperlipidemia     Gout, arthritis     Type 2 diabetes mellitus without complication, without long-term current use of insulin 05/22/2024    Class 3 severe obesity with body mass index (BMI) of 45.0 to 49.9 in adult 05/22/2024    Primary osteoarthritis of right knee 05/06/2024    Impression:  Status post right TKR doing well  Plan:  Slow progression of weight-bearing activities outlined.  May transition to a cane in left hand for safety.  Recheck 3 months repeat x-ray.      David Norris M.D.

## 2024-07-11 ENCOUNTER — CLINICAL SUPPORT (OUTPATIENT)
Dept: REHABILITATION | Facility: HOSPITAL | Age: 59
End: 2024-07-11
Payer: COMMERCIAL

## 2024-07-11 DIAGNOSIS — M25.561 ACUTE PAIN OF RIGHT KNEE: Primary | ICD-10-CM

## 2024-07-11 PROCEDURE — 97140 MANUAL THERAPY 1/> REGIONS: CPT

## 2024-07-11 PROCEDURE — 97530 THERAPEUTIC ACTIVITIES: CPT

## 2024-07-11 PROCEDURE — 97112 NEUROMUSCULAR REEDUCATION: CPT

## 2024-07-11 PROCEDURE — 97110 THERAPEUTIC EXERCISES: CPT

## 2024-07-11 NOTE — PROGRESS NOTES
"[facility]   Physical Therapy Treatment Note      Name: Basilio Francis  [facility] Number: [id number]    Therapy Diagnosis:   Encounter Diagnosis   Name Primary?    Acute pain of right knee Yes         Physician: Malka Lebron PA    Visit Date: 7/11/2024    Physician Orders: PT Eval and Treat   Medical Diagnosis from Referral: R TKA   Evaluation Date: 6/18/2024  Authorization Period Expiration: 6/12/2024  Plan of Care Expiration: 8/2/2024  Progress Note Due: 8/2/2024  Date of Surgery: 5/28/2024  Visit # / Visits authorized: 8/12   FOTO: to be completed on visit 7     Precautions: Standard      Time In: 1100  Time Out: 1145  Total Billable Time: 45 minutes      PTA Visit #: 0/5   Subjective     Patient reports: pt presents to PT with no walker and only single point straight cane today. He says that they took off all the steristrips and silvino hose when he went to MD. Says he got a good report at MD visit. No buckling in the knee when walking.    He was compliant with home exercise program.  Response to previous treatment: no adverse effects  Functional change: Too early in Plan of Care     Pain: 0/10  Location: right knee      Objective      Objective Measures updated at progress report unless specified.     Treatment     Bautista received the treatments listed below:      Therapeutic exercise total time: 17 minutes. See flowsheet below.     Ther- Ex Reps    Bike 8 minutes    Wedge 3x30 sec   LAQ's (not performed 7/11/2024)  20 x 3"    Hamstring Curls (not performed 7/11/2024)  20 x yellow TB    Quad sets  3 x 10   Straight Leg Raises 3 x 10   Heel slides (not performed 7/11/2024)  23 x (94 degrees)    Heel Raises (not performed 7/11/2024)  2 x 10    Standing Hip and knee Flexion (not performed 7/11/2024)  2 x 10 R    Knee flex stretch (not performed 7/11/2024)  3x30 sec   Hamstring Stretch (not performed 7/11/2024)  3x30 sec      Neuromuscular reeducation total time: 12 minutes. See flowsheet " below.     Neuro re-ed activity Performed today Reps/Sets/Hold time   SAQ [x]Yes    []No 30 x 3 sec hold   Prone QS [x]Yes    []No 10 x 10 sec   QS shift [x]Yes    []No 10 x 10 sec   High knee marching in // bars; 3 sec squeeze into TKE on stance leg, heel strike BLE [x]Yes    []No 1 trip    []Yes    []No      Manual therapy total time: 8 minutes. See flowsheet below.     Manual therapy technique Performed today   Knee flex with IR at EOB []Yes    [x]No   Knee ext OP with superior patellar slide []Yes    [x]No    []Yes    []No    []Yes    []No    []Yes    []No     Therapeutic activity total time: 8 minutes. See flowsheet below.    Therapeutic activity Performed today Reps/Sets/Hold time   Step downs, lateral [x]Yes    []No 3 x 10    Mini squats  [x]Yes    []No 15    []Yes    []No     []Yes    []No     []Yes    []No      Therapeutic modalities total time: 0 minutes. See flowsheet below.    Modality  Performed today Parameters    []Yes    []No     []Yes    []No     []Yes    []No     []Yes    []No     []Yes    []No      Cold pack applied to R knee with elevation above heart level for 6 minutes.   Patient Education and Home Exercises       Education provided:   - Plan of Care, Home exercise program, Delayed onset muscle soreness     Written Home Exercises Provided: Patient instructed to cont prior HEP. Exercises were reviewed and Bautista was able to demonstrate them prior to the end of the session.  Bautista demonstrated good  understanding of the education provided. See Electronic Medical Record under Patient Instructions for exercises provided during therapy sessions    Assessment   Continued with exercises/activities from previous treatment session with good tolerance and no reports of increased pain in the knee. Added some new therapeutic activities for functional strengthening to the LE's. Pt required only min to mod cuing throughout session for good quad recruitment, and he need mod cuing for correct mini squat form  for more LE muscle activation versus lower back recruitment. Will continue to progress as able and assess response to treatment at next session.     Pt with R knee -3 degrees resting ext, -3 degrees active ext, and no lag with SLR. Flex to 100 degrees.     Patient prognosis is Good.   Patient will benefit from skilled outpatient Physical Therapy to address the deficits stated above and in the chart below, provide patient /family education, and to maximize patientt's level of independence.      Plan of care discussed with patient: Yes  Patient's spiritual, cultural and educational needs considered and patient is agreeable to the plan of care and goals as stated below:      Anticipated Barriers for therapy: compliance with Home exercise program    Goals:  Short Term Goals: 3 weeks   Patient will independently complete home exercise program with correct form.   Patient will ambulate independently with straight cane for increased independence with community ambulation.   Patient will complete sit to stand transfer with no UE use for increased independence with functional transfers.      Long Term Goals: 6 weeks   Pt will increase R knee flexion to 110, knee extension to 0 degrees, and quad lag to 0 degrees to allow patient normal gait pattern.  Pt will increase R knee strength to 4/5 to allow patient to safely return to prior level of function   Pt will report decrease in pain to less than or equal to 2/10 to improve quality of life  Pt will ambulate with increased gait speed to greater than or equal to 2.25 feet per second for increased safety with community ambulation.   Pt will negotiate up and down stairs with a reciprocal gait pattern for increased safety with community mobility.     Plan   Plan of care Certification: 6/18/2024 to 8/2/2024.     Outpatient Physical Therapy 2 times weekly for 6 weeks to include the following interventions: Gait Training, Manual Therapy, Moist Heat/ Ice, Neuromuscular Re-ed, Patient  Education, Therapeutic Activities, and Therapeutic Exercise.     Continue POC per PT orders to progress patient toward rehab goals.    DEIDRA Carty  7/11/2024

## 2024-07-16 ENCOUNTER — CLINICAL SUPPORT (OUTPATIENT)
Dept: REHABILITATION | Facility: HOSPITAL | Age: 59
End: 2024-07-16
Payer: COMMERCIAL

## 2024-07-16 DIAGNOSIS — M25.561 ACUTE PAIN OF RIGHT KNEE: Primary | ICD-10-CM

## 2024-07-16 PROCEDURE — 97112 NEUROMUSCULAR REEDUCATION: CPT

## 2024-07-16 PROCEDURE — 97530 THERAPEUTIC ACTIVITIES: CPT

## 2024-07-16 PROCEDURE — 97110 THERAPEUTIC EXERCISES: CPT

## 2024-07-16 NOTE — PROGRESS NOTES
"[facility]   Physical Therapy Treatment Note      Name: Basilio Francis  [facility] Number: [id number]    Therapy Diagnosis:   Encounter Diagnosis   Name Primary?    Acute pain of right knee Yes         Physician: Malka Lebron PA    Visit Date: 7/16/2024    Physician Orders: PT Eval and Treat   Medical Diagnosis from Referral: R TKA   Evaluation Date: 6/18/2024  Authorization Period Expiration: 6/12/2024  Plan of Care Expiration: 8/2/2024  Progress Note Due: 8/2/2024  Date of Surgery: 5/28/2024  Visit # / Visits authorized: 8/12   FOTO: to be completed on visit 7     Precautions: Standard      Time In: 1315  Time Out: 1400  Total Billable Time: 45 minutes      PTA Visit #: 0/5   Subjective     Patient reports: no buckling in the knee recently, and only a little soreness/stiffness.     He was compliant with home exercise program.  Response to previous treatment: no adverse effects  Functional change: Too early in Plan of Care     Pain: 0/10  Location: right knee      Objective      Objective Measures updated at progress report unless specified.     Treatment     Bautista received the treatments listed below:      Therapeutic exercise total time: 24 minutes. See flowsheet below.     Ther- Ex Performed today Reps    Bike [x]Yes    []No 8 minutes    Wedge [x]Yes    []No 3x30 sec   LAQ's  []Yes    [x]No 20 x 3"    Hamstring Curls  []Yes    [x]No 20 x yellow TB    Quad sets  [x]Yes    []No 3 x 10   Straight Leg Raises [x]Yes    []No 3 x 10   Heel slides  []Yes    [x]No 23 x (94 degrees)    Heel Raises  []Yes    [x]No 2 x 10    Standing Hip and knee Flexion   []Yes    [x]No 2 x 10 R    Knee flex stretch   []Yes    [x]No 3x30 sec   Hamstring Stretch  []Yes    [x]No 3x30 sec      Neuromuscular reeducation total time: 13 minutes. See flowsheet below.     Neuro re-ed activity Performed today Reps/Sets/Hold time   SAQ [x]Yes    []No 30 x 3 sec hold   Prone QS [x]Yes    []No 10 x 10 sec   QS shift [x]Yes "    []No 10 x 10 sec   High knee marching in // bars; 3 sec squeeze into TKE on stance leg, heel strike BLE []Yes    [x]No 1 trip          Manual therapy total time: 0 minutes. See flowsheet below.     Manual therapy technique Performed today   Knee flex with IR at EOB []Yes    [x]No   Knee ext OP with superior patellar slide []Yes    [x]No    []Yes    []No    []Yes    []No    []Yes    []No     Therapeutic activity total time: 8 minutes. See flowsheet below.    Therapeutic activity Performed today Reps/Sets/Hold time   Step downs, lateral; RLE on 4-inch step [x]Yes    []No 2 x 10    Mini squats  [x]Yes    []No 2 x 10   Step ups, 6-inch; RLE [x]Yes    []No 2 x 10     []Yes    []No     []Yes    []No      Therapeutic modalities total time: 0 minutes. See flowsheet below.    Modality  Performed today Parameters    []Yes    []No     []Yes    []No     []Yes    []No     []Yes    []No     []Yes    []No      Cold pack applied to R knee with elevation above heart level for 6 minutes.   Patient Education and Home Exercises       Education provided:   - Plan of Care, Home exercise program, Delayed onset muscle soreness     Written Home Exercises Provided: Patient instructed to cont prior HEP. Exercises were reviewed and Bautista was able to demonstrate them prior to the end of the session.  Bautista demonstrated good  understanding of the education provided. See Electronic Medical Record under Patient Instructions for exercises provided during therapy sessions    Assessment   Continued with exercises/activities from previous treatment session for knee mobility/flexibility and strengthening. He denies an increase in pain with treatment session today, and he does not have any buckling/shifting during session. Will continue to progress as able and assess response to treatment at next session.     Patient prognosis is Good.   Patient will benefit from skilled outpatient Physical Therapy to address the deficits stated above and in the  chart below, provide patient /family education, and to maximize patientt's level of independence.      Plan of care discussed with patient: Yes  Patient's spiritual, cultural and educational needs considered and patient is agreeable to the plan of care and goals as stated below:      Anticipated Barriers for therapy: compliance with Home exercise program    Goals:  Short Term Goals: 3 weeks   Patient will independently complete home exercise program with correct form.   Patient will ambulate independently with straight cane for increased independence with community ambulation.   Patient will complete sit to stand transfer with no UE use for increased independence with functional transfers.      Long Term Goals: 6 weeks   Pt will increase R knee flexion to 110, knee extension to 0 degrees, and quad lag to 0 degrees to allow patient normal gait pattern.  Pt will increase R knee strength to 4/5 to allow patient to safely return to prior level of function   Pt will report decrease in pain to less than or equal to 2/10 to improve quality of life  Pt will ambulate with increased gait speed to greater than or equal to 2.25 feet per second for increased safety with community ambulation.   Pt will negotiate up and down stairs with a reciprocal gait pattern for increased safety with community mobility.     Plan   Plan of care Certification: 6/18/2024 to 8/2/2024.     Outpatient Physical Therapy 2 times weekly for 6 weeks to include the following interventions: Gait Training, Manual Therapy, Moist Heat/ Ice, Neuromuscular Re-ed, Patient Education, Therapeutic Activities, and Therapeutic Exercise.     Continue POC per PT orders to progress patient toward rehab goals.      Gabriel Clemente, PT, DPT  7/16/2024

## 2024-07-17 ENCOUNTER — EXTERNAL HOME HEALTH (OUTPATIENT)
Dept: HOME HEALTH SERVICES | Facility: HOSPITAL | Age: 59
End: 2024-07-17
Payer: COMMERCIAL

## 2024-07-18 ENCOUNTER — CLINICAL SUPPORT (OUTPATIENT)
Dept: REHABILITATION | Facility: HOSPITAL | Age: 59
End: 2024-07-18
Payer: COMMERCIAL

## 2024-07-18 DIAGNOSIS — M25.561 ACUTE PAIN OF RIGHT KNEE: Primary | ICD-10-CM

## 2024-07-18 PROCEDURE — 97110 THERAPEUTIC EXERCISES: CPT

## 2024-07-18 PROCEDURE — 97530 THERAPEUTIC ACTIVITIES: CPT

## 2024-07-18 NOTE — PROGRESS NOTES
"[facility]   Physical Therapy Treatment Note      Name: Basilio Francis  [facility] Number: [id number]    Therapy Diagnosis:   Encounter Diagnosis   Name Primary?    Acute pain of right knee Yes         Physician: Malka Lebron PA    Visit Date: 7/18/2024    Physician Orders: PT Eval and Treat   Medical Diagnosis from Referral: R TKA   Evaluation Date: 6/18/2024  Authorization Period Expiration: 6/12/2024  Plan of Care Expiration: 8/2/2024  Progress Note Due: 8/2/2024  Date of Surgery: 5/28/2024  Visit # / Visits authorized: 9/12   FOTO: to be completed on visit 7     Precautions: Standard      Time In: 1230  Time Out: 13:23  Total Billable Time: 53 minutes  (45 minutes billed and 8 minutes not billed for ice)     PTA Visit #: 0/5   Subjective     Patient reports: no R knee pain.     He was compliant with home exercise program.  Response to previous treatment: no adverse effects  Functional change: improved mobility with less pain     Pain: 0/10  Location: right knee      Objective      Objective Measures updated at progress report unless specified.     Treatment     Bautista received the treatments listed below:      Therapeutic exercise total time: 24 minutes. See flowsheet below.     Ther- Ex Performed today Reps    Bike [x]Yes    []No 8 minutes    Wedge [x]Yes    []No 3x30 sec   LAQ's  [x]Yes    []No 20 x 3"    Hamstring Curls  []Yes    [x]No 20 x yellow TB    Quad sets  [x]Yes    []No 3 x 10   Straight Leg Raises [x]Yes    []No 2 x 10   Heel slides  [x]Yes    []No 30  x (104 degrees)    Heel Raises  []Yes    [x]No 2 x 10    Standing Hip and knee Flexion   []Yes    [x]No 2 x 10 R    Knee flex stretch   []Yes    [x]No 3x30 sec   Hamstring Stretch  []Yes    [x]No 3x30 sec      Neuromuscular reeducation total time: 3 minutes. See flowsheet below.     Neuro re-ed activity Performed today Reps/Sets/Hold time   SAQ [x]Yes    []No 30 x 3 sec hold   Prone QS []Yes    [x]No 10 x 10 sec   QS shift " []Yes    [x]No 10 x 10 sec   High knee marching in // bars; 3 sec squeeze into TKE on stance leg, heel strike BLE []Yes    [x]No 1 trip          Manual therapy total time: 0 minutes. See flowsheet below.     Manual therapy technique Performed today   Knee flex with IR at EOB []Yes    [x]No   Knee ext OP with superior patellar slide []Yes    [x]No    []Yes    []No    []Yes    []No    []Yes    []No     Therapeutic activity total time: 18 minutes. See flowsheet below.    Therapeutic activity Performed today Reps/Sets/Hold time   Step downs, lateral; RLE on 4-inch step [x]Yes    []No 2 x 10    Mini squats  [x]Yes    []No 2 x 10   Step ups, 6-inch; RLE [x]Yes    []No 2 x 10    Heel Raises  [x]Yes    []No 2 x 10    Step downs, forward; RLE on 4-inch step  [x]Yes    []No 2 x 10             Cold pack applied to R knee with elevation above heart level for 8 minutes.   Patient Education and Home Exercises       Education provided:   - Plan of Care, Home exercise program, Delayed onset muscle soreness     Written Home Exercises Provided: Patient instructed to cont prior HEP. Exercises were reviewed and Bautista was able to demonstrate them prior to the end of the session.  Bautista demonstrated good  understanding of the education provided. See Electronic Medical Record under Patient Instructions for exercises provided during therapy sessions    Assessment   Patient continues to required moderate verbal, visual and tactile cues to complete exercises with correct form, speed, decreased compensations, and increased eccentric control. Patient required occasional rest breaks during standing tasks due to  R LE fatigue. Patient demonstrated improved R knee active range of motion from 4-104 degrees in supine position. Patient has about 5 degree of R quad lag with straight leg raise exercises. No adverse effects noted to treatment.     Patient prognosis is Good.   Patient will benefit from skilled outpatient Physical Therapy to address the  deficits stated above and in the chart below, provide patient /family education, and to maximize patientt's level of independence.      Plan of care discussed with patient: Yes  Patient's spiritual, cultural and educational needs considered and patient is agreeable to the plan of care and goals as stated below:      Anticipated Barriers for therapy: compliance with Home exercise program    Goals:  Short Term Goals: 3 weeks   Patient will independently complete home exercise program with correct form.   Patient will ambulate independently with straight cane for increased independence with community ambulation.   Patient will complete sit to stand transfer with no UE use for increased independence with functional transfers.      Long Term Goals: 6 weeks   Pt will increase R knee flexion to 110, knee extension to 0 degrees, and quad lag to 0 degrees to allow patient normal gait pattern.  Pt will increase R knee strength to 4/5 to allow patient to safely return to prior level of function   Pt will report decrease in pain to less than or equal to 2/10 to improve quality of life  Pt will ambulate with increased gait speed to greater than or equal to 2.25 feet per second for increased safety with community ambulation.   Pt will negotiate up and down stairs with a reciprocal gait pattern for increased safety with community mobility.     Plan   Plan of care Certification: 6/18/2024 to 8/2/2024.     Outpatient Physical Therapy 2 times weekly for 6 weeks to include the following interventions: Gait Training, Manual Therapy, Moist Heat/ Ice, Neuromuscular Re-ed, Patient Education, Therapeutic Activities, and Therapeutic Exercise.     Continue POC per PT orders to progress patient toward rehab goals.      Chirag May, PT, DPT     7/18/2024

## 2024-07-23 ENCOUNTER — CLINICAL SUPPORT (OUTPATIENT)
Dept: REHABILITATION | Facility: HOSPITAL | Age: 59
End: 2024-07-23
Payer: COMMERCIAL

## 2024-07-23 DIAGNOSIS — M25.561 ACUTE PAIN OF RIGHT KNEE: Primary | ICD-10-CM

## 2024-07-23 PROCEDURE — 97530 THERAPEUTIC ACTIVITIES: CPT

## 2024-07-23 PROCEDURE — 97110 THERAPEUTIC EXERCISES: CPT | Mod: KX

## 2024-07-23 PROCEDURE — 97112 NEUROMUSCULAR REEDUCATION: CPT | Mod: KX

## 2024-07-23 NOTE — PROGRESS NOTES
"[facility]   Physical Therapy Treatment Note      Name: Basilio Francis  [facility] Number: [id number]    Therapy Diagnosis:   Encounter Diagnosis   Name Primary?    Acute pain of right knee Yes         Physician: Malka Lebron PA    Visit Date: 7/23/2024    Physician Orders: PT Eval and Treat   Medical Diagnosis from Referral: R TKA   Evaluation Date: 6/18/2024  Authorization Period Expiration: 6/12/2024  Plan of Care Expiration: 8/2/2024  Progress Note Due: 8/2/2024  Date of Surgery: 5/28/2024  Visit # / Visits authorized: 10/12   FOTO: to be completed on visit 7     Precautions: Standard      Time In: 1230  Time Out: 1315  Total Billable Time: 45 minutes    PTA Visit #: 0/5   Subjective     Patient reports: no R knee pain, no buckling/shifting in the knee.     He was compliant with home exercise program.  Response to previous treatment: no adverse effects  Functional change: improved mobility with less pain     Pain: 0/10  Location: right knee      Objective      Objective Measures updated at progress report unless specified.     Treatment     Bautista received the treatments listed below:      Therapeutic exercise total time: 15 minutes. See flowsheet below.     Ther- Ex Performed today Reps    Bike [x]Yes    []No 8 minutes    Wedge [x]Yes    []No 3x30 sec   LAQ's  []Yes    [x]No 20 x 3"    Hamstring Curls  []Yes    [x]No 20 x yellow TB    Quad sets  []Yes    [x]No 3 x 10   Straight Leg Raises, [place] tap overs [x]Yes    []No 2 x 10   Heel slides  []Yes    []No 30  x (104 degrees)    Heel Raises, off 4-inch step [x]Yes    []No 3 x 10    Standing Hip and knee Flexion   []Yes    [x]No 2 x 10 R    Knee flex stretch   []Yes    [x]No 3x30 sec   Hamstring Stretch  []Yes    [x]No 3x30 sec      Neuromuscular reeducation total time: 12 minutes. See flowsheet below.     Neuro re-ed activity Performed today Reps/Sets/Hold time   SAQ [x]Yes    []No 20 x 3 sec hold   Prone QS []Yes    [x]No 10 x 10 " sec   QS shift [x]Yes    []No 10 x 10 sec   High knee marching in // bars; 3 sec squeeze into TKE on stance leg, heel strike BLE [x]Yes    []No 1 trip          Manual therapy total time: 0 minutes. See flowsheet below.     Manual therapy technique Performed today   Knee flex with IR at EOB []Yes    [x]No   Knee ext OP with superior patellar slide []Yes    [x]No    []Yes    []No    []Yes    []No    []Yes    []No     Therapeutic activity total time: 18 minutes. See flowsheet below.    Therapeutic activity Performed today Reps/Sets/Hold time   Step downs, lateral; RLE on 4-inch step [x]Yes    []No 2 x 10    Sit to stands, from low plinth [x]Yes    []No 3 x 5    Step ups, 6-inch; RLE [x]Yes    []No 2 x 10    Heel raises, off 6-inch step [x]Yes    []No 3 x 10    Step downs, forward; RLE on 4-inch step  [x]Yes    []No 2 x 10    Static lunges, at stairs []Yes    [x]No        Cold pack applied to R knee with elevation above heart level for 8 minutes.   Patient Education and Home Exercises       Education provided:   - Plan of Care, Home exercise program, Delayed onset muscle soreness     Written Home Exercises Provided: Patient instructed to cont prior HEP. Exercises were reviewed and Bautista was able to demonstrate them prior to the end of the session.  Bautista demonstrated good  understanding of the education provided. See Electronic Medical Record under Patient Instructions for exercises provided during therapy sessions    Assessment   Pt with R knee -5 degrees resting ext, 0 degrees active ext, and no lag with SLR. Continued with exercises/activities from previous treatment session with greater emphasis on functional activities such as step ups, step downs, sit to stands, and gait. Will continue to progress to pt's tolerance and assess response to treatment at next session.       Patient prognosis is Good.   Patient will benefit from skilled outpatient Physical Therapy to address the deficits stated above and in the chart  below, provide patient /family education, and to maximize patientt's level of independence.      Plan of care discussed with patient: Yes  Patient's spiritual, cultural and educational needs considered and patient is agreeable to the plan of care and goals as stated below:      Anticipated Barriers for therapy: compliance with Home exercise program    Goals:  Short Term Goals: 3 weeks   Patient will independently complete home exercise program with correct form.   Patient will ambulate independently with straight cane for increased independence with community ambulation.   Patient will complete sit to stand transfer with no UE use for increased independence with functional transfers.      Long Term Goals: 6 weeks   Pt will increase R knee flexion to 110, knee extension to 0 degrees, and quad lag to 0 degrees to allow patient normal gait pattern.  Pt will increase R knee strength to 4/5 to allow patient to safely return to prior level of function   Pt will report decrease in pain to less than or equal to 2/10 to improve quality of life  Pt will ambulate with increased gait speed to greater than or equal to 2.25 feet per second for increased safety with community ambulation.   Pt will negotiate up and down stairs with a reciprocal gait pattern for increased safety with community mobility.     Plan   Plan of care Certification: 6/18/2024 to 8/2/2024.     Outpatient Physical Therapy 2 times weekly for 6 weeks to include the following interventions: Gait Training, Manual Therapy, Moist Heat/ Ice, Neuromuscular Re-ed, Patient Education, Therapeutic Activities, and Therapeutic Exercise.     Continue POC per PT orders to progress patient toward rehab goals.      Gabriel Clemente, PT, DPT     7/23/2024

## 2024-07-25 ENCOUNTER — CLINICAL SUPPORT (OUTPATIENT)
Dept: REHABILITATION | Facility: HOSPITAL | Age: 59
End: 2024-07-25
Payer: COMMERCIAL

## 2024-07-25 DIAGNOSIS — M25.561 ACUTE PAIN OF RIGHT KNEE: Primary | ICD-10-CM

## 2024-07-25 PROCEDURE — 97112 NEUROMUSCULAR REEDUCATION: CPT

## 2024-07-25 PROCEDURE — 97110 THERAPEUTIC EXERCISES: CPT

## 2024-07-25 PROCEDURE — 97530 THERAPEUTIC ACTIVITIES: CPT

## 2024-07-25 NOTE — PROGRESS NOTES
"[facility]   Physical Therapy Treatment Note      Name: Basilio Francis  Clinic Number: [id number]    Therapy Diagnosis:   Encounter Diagnosis   Name Primary?    Acute pain of right knee Yes       Physician: Malka Lebron PA    Visit Date: 7/25/2024    Physician Orders: PT Eval and Treat   Medical Diagnosis from Referral: R TKA   Evaluation Date: 6/18/2024  Authorization Period Expiration: 6/12/2024  Plan of Care Expiration: 8/16/2024  Progress Note Due: 8/16/2024  Date of Surgery: 5/28/2024  Visit # / Visits authorized: 11/18   FOTO: to be completed on visit 7     Precautions: Standard      Time In: 1228  Time Out: 1307  Total Billable Time: 39 minutes    PTA Visit #: 0/5   Subjective     Patient reports: "My knee is doing good."     He was compliant with home exercise program.  Response to previous treatment: no adverse effects  Functional change: improved mobility with less pain     Pain: 0/10  Location: right knee      Objective      Objective Measures updated at progress report unless specified.     Treatment     Bautista received the treatments listed below:      Therapeutic exercise total time: 12 minutes. See flowsheet below.     Ther- Ex Performed today Reps    Bike [x]Yes    []No 6 minutes    Wedge [x]Yes    []No 3x30 sec   LAQ's  []Yes    [x]No 20 x 3"    Hamstring Curls  []Yes    [x]No 20 x yellow TB    Quad sets  []Yes    [x]No 3 x 10   Straight Leg Raises, [place] tap overs [x]Yes    []No 2 x 10   Heel slides with ankle pump [x]Yes    []No 30  x (110 degrees)    Knee flex stretch   []Yes    [x]No 3x30 sec   Hamstring Stretch  []Yes    [x]No 3x30 sec      Neuromuscular reeducation total time: 9 minutes. See flowsheet below.     Neuro re-ed activity Performed today Reps/Sets/Hold time   SAQ [x]Yes    []No 20 x 3 sec hold   Prone QS []Yes    [x]No 10 x 10 sec   QS shift [x]Yes    []No 10 x 10 sec   High knee marching in // bars; 3 sec squeeze into TKE on stance leg, heel strike " BLE []Yes    [x]No    TKEs standing  [x]Yes    []No 2 x 10 red TB      Manual therapy total time: 0 minutes. See flowsheet below.     Manual therapy technique Performed today   Knee flex with IR at EOB []Yes    [x]No   Knee ext OP with superior patellar slide []Yes    [x]No    []Yes    []No    []Yes    []No    []Yes    []No     Therapeutic activity total time: 18 minutes. See flowsheet below.    Therapeutic activity Performed today Reps/Sets/Hold time   Step downs, lateral; RLE on 4-inch step [x]Yes    []No 2 x 10    Sit to stands, from low plinth [x]Yes    []No 3 x 5    Step ups, 6-inch; RLE [x]Yes    []No 2 x 10    Heel raises, off 6-inch step [x]Yes    []No 3 x 10    Step downs, forward; RLE on 4-inch step  [x]Yes    []No 2 x 10    Mini Squats  [x]Yes    []No 2 x 10 with red band      Patient Education and Home Exercises       Education provided:   - Plan of Care, Home exercise program, Delayed onset muscle soreness     Written Home Exercises Provided: Patient instructed to cont prior HEP. Exercises were reviewed and Bautista was able to demonstrate them prior to the end of the session.  Bautista demonstrated good  understanding of the education provided. See Electronic Medical Record under Patient Instructions for exercises provided during therapy sessions    Assessment   Patient demonstrates good carryover of exercise instruction from previous sessions. He required minimal cues for progression through standing tasks with improved control and speed to increase effectiveness. PT added hip strengthening exercises to continue progressing toward rehab goals. PT completed patient goal assessment for last scheduled visit in PT plan of care. Patient continues to have deficits in R knee strength and motor control resulting in decreased functional endurance and stability. PT and patient agree to recert 2 times per week for 3 weeks to continue increasing R LE strength and motor control.       Patient prognosis is Good.    Patient will benefit from skilled outpatient Physical Therapy to address the deficits stated above and in the chart below, provide patient /family education, and to maximize patientt's level of independence.      Plan of care discussed with patient: Yes  Patient's spiritual, cultural and educational needs considered and patient is agreeable to the plan of care and goals as stated below:      Anticipated Barriers for therapy: compliance with Home exercise program    Goals:  Short Term Goals: 3 weeks   Patient will independently complete home exercise program with correct form. -MET   Patient will ambulate independently with straight cane for increased independence with community ambulation. -MET   Patient will complete sit to stand transfer with no UE use for increased independence with functional transfers. -MET      Long Term Goals: 6 weeks   Pt will increase R knee flexion to 110, knee extension to 0 degrees, and quad lag to 0 degrees to allow patient normal gait pattern.-MET   Pt will increase R knee strength to 4/5 to allow patient to safely return to prior level of function -Goal in progress  Pt will report decrease in pain to less than or equal to 2/10 to improve quality of life-MET   Pt will ambulate with increased gait speed to greater than or equal to 2.25 feet per second for increased safety with community ambulation. -Met 2.5 feet per second   Pt will negotiate up and down stairs with a reciprocal gait pattern for increased safety with community mobility.-Goal in progress      Plan   Plan of care Certification: 6/18/2024 to 8/2/2024.     Outpatient Physical Therapy 2 times weekly for 6 weeks to include the following interventions: Gait Training, Manual Therapy, Moist Heat/ Ice, Neuromuscular Re-ed, Patient Education, Therapeutic Activities, and Therapeutic Exercise.     Continue POC per PT orders to progress patient toward rehab goals.      Chirag May, PT, DPT       7/25/2024

## 2024-07-25 NOTE — PLAN OF CARE
[facility]  Physical Therapy Plan of Care Note     Name: Basilio Francis  [facility] Number: [id number]    Therapy Diagnosis:   Encounter Diagnosis   Name Primary?    Acute pain of right knee Yes     Physician: Malka Lebron PA    Visit Date: 7/25/2024    Physician Orders: Eval and Treat   Medical Diagnosis from Referral: R TKA   Evaluation Date: 6/18/2024  Authorization Period Expiration: 6/12/2024  Plan of Care Expiration: 8/16/2024  Progress Note Due: 8/16/2024  Date of Surgery: 5/28/2024  Visit # / Visits authorized: 11/18   FOTO: to be completed on visit 12    Precautions: Standard  Functional Level Prior to Evaluation:  Independent     Subjective     Update: Patient denies R knee pain.     Objective      Update: R knee flexion : 110 degrees  R knee extension: 0 degrees     Assessment     Update: Patient can benefit from continued skilled PT services to continue increasing R knee strength and motor control.     Previous Short Term Goals Status:     Patient will independently complete home exercise program with correct form. -MET   Patient will ambulate independently with straight cane for increased independence with community ambulation. -MET   Patient will complete sit to stand transfer with no UE use for increased independence with functional transfers. -MET     Long Term Goal Status: continue per initial plan of care.  Reasons for Recertification of Therapy:   Continue progressing toward rehab goals.    GOALS  Pt will increase R knee flexion to 110, knee extension to 0 degrees, and quad lag to 0 degrees to allow patient normal gait pattern.-MET   Pt will increase R knee strength to 4/5 to allow patient to safely return to prior level of function -Goal in progress  Pt will report decrease in pain to less than or equal to 2/10 to improve quality of life-MET   Pt will ambulate with increased gait speed to greater than or equal to 2.25 feet per second for increased safety with  community ambulation. -Met 2.5 feet per second   Pt will negotiate up and down stairs with a reciprocal gait pattern for increased safety with community mobility.-Goal in progress      Plan     Updated Certification Period: 7/25/2024 to 8/16/2024   Recommended Treatment Plan: 2 times per week for 3 weeks:  Gait Training, Manual Therapy, Moist Heat/ Ice, Neuromuscular Re-ed, Patient Education, Therapeutic Activities, and Therapeutic Exercise    Chirag May, PT, DPT     Physician Signature : ____________________________________________________  Date:_______________________________________________

## 2024-07-30 ENCOUNTER — CLINICAL SUPPORT (OUTPATIENT)
Dept: REHABILITATION | Facility: HOSPITAL | Age: 59
End: 2024-07-30
Payer: COMMERCIAL

## 2024-07-30 DIAGNOSIS — M25.561 ACUTE PAIN OF RIGHT KNEE: Primary | ICD-10-CM

## 2024-07-30 PROCEDURE — 97530 THERAPEUTIC ACTIVITIES: CPT

## 2024-07-30 PROCEDURE — 97110 THERAPEUTIC EXERCISES: CPT | Mod: KX

## 2024-07-30 PROCEDURE — 97112 NEUROMUSCULAR REEDUCATION: CPT | Mod: KX

## 2024-07-30 NOTE — PROGRESS NOTES
"[facility]   Physical Therapy Treatment Note      Name: Basilio Francis  Clinic Number: [id number]    Therapy Diagnosis:   Encounter Diagnosis   Name Primary?    Acute pain of right knee Yes       Physician: Malka Lebron PA    Visit Date: 7/30/2024    Physician Orders: PT Eval and Treat   Medical Diagnosis from Referral: R TKA   Evaluation Date: 6/18/2024  Authorization Period Expiration: 6/12/2024  Plan of Care Expiration: 8/16/2024  Progress Note Due: 8/16/2024  Date of Surgery: 5/28/2024  Visit # / Visits authorized: 11/18   FOTO: to be completed on visit 7     Precautions: Standard      Time In: 1445  Time Out: 1534  Total Billable Time: 49 minutes    PTA Visit #: 0/5   Subjective     Patient reports: no pain in the knee, just a little soreness from time to time. Still using single point straight cane for ambulation.      He was compliant with home exercise program.  Response to previous treatment: no adverse effects  Functional change: improved mobility with less pain     Pain: 0/10  Location: right knee      Objective      Objective Measures updated at progress report unless specified.     Treatment     Bautista received the treatments listed below:      Therapeutic exercise total time: 24 minutes. See flowsheet below.     Ther- Ex Performed today Reps    Bike [x]Yes    []No 8 minutes    Wedge [x]Yes    []No 3x30 sec   LAQ's  []Yes    [x]No 20 x 3"    HS curl with swiss ball [x]Yes    []No 20   Quad sets  []Yes    [x]No 3 x 10   Straight Leg Raises, [place] tap overs [x]Yes    []No 2 x 10   Heel slides with ankle pump []Yes    [x]No 30  x (110 degrees)    Knee flex stretch   []Yes    [x]No 3x30 sec   Hamstring Stretch  []Yes    [x]No 3x30 sec      Neuromuscular reeducation total time: 15 minutes. See flowsheet below.     Neuro re-ed activity Performed today Reps/Sets/Hold time   SAQ [x]Yes    []No 20 x 3 sec hold, 3 lb cuff weight   Long bridge on ball [x]Yes    []No 20   Prone QS " [x]Yes    []No 10 x 10 sec   QS shift [x]Yes    []No 10 x 10 sec   High knee marching in // bars; 3 sec squeeze into TKE on stance leg, heel strike BLE [x]Yes    []No    TKEs standing  []Yes    [x]No 2 x 10 red TB      Manual therapy total time: 0 minutes. See flowsheet below.     Manual therapy technique Performed today   Knee flex with IR at EOB []Yes    [x]No   Knee ext OP with superior patellar slide []Yes    [x]No    []Yes    []No    []Yes    []No    []Yes    []No     Therapeutic activity total time: 10 minutes. See flowsheet below.    Therapeutic activity Performed today Reps/Sets/Hold time   Step downs, lateral; RLE on 4-inch step [x]Yes    []No 2 x 10    Sit to stands, from standard chair; no HHA [x]Yes    []No 2 x 10   Step ups, 6-inch; RLE [x]Yes    []No 2 x 10    Heel raises, off 6-inch step []Yes    [x]No 3 x 10    Step downs, forward; RLE on 4-inch step  []Yes    [x]No 2 x 10    Mini Squats  []Yes    [x]No 2 x 10 with red band    Mini lunges, in // bars; each LE forward  [x]Yes    []No 10 each LE     Patient Education and Home Exercises       Education provided:   - Plan of Care, Home exercise program, Delayed onset muscle soreness     Written Home Exercises Provided: Patient instructed to cont prior HEP. Exercises were reviewed and Bautista was able to demonstrate them prior to the end of the session.  Bautista demonstrated good  understanding of the education provided. See Electronic Medical Record under Patient Instructions for exercises provided during therapy sessions    Assessment   Continued with exercises/activities from previous treatment session with good tolerance and good form with very minimal cuing required for established exercises. However, did add some new LE strengthening which required min to mod verbal and visual cuing for pt to demonstrate good form. He denies pain with treatment today. He is still ambulating with single point straight cane, but he is barely utilizing it during gait  cycle. Will continue to engage in functional strengthening and emphasize good gait mechanics to return pt to prior level of function.     Patient prognosis is Good.   Patient will benefit from skilled outpatient Physical Therapy to address the deficits stated above and in the chart below, provide patient /family education, and to maximize patientt's level of independence.      Plan of care discussed with patient: Yes  Patient's spiritual, cultural and educational needs considered and patient is agreeable to the plan of care and goals as stated below:      Anticipated Barriers for therapy: compliance with Home exercise program    Goals:  Short Term Goals: 3 weeks   Patient will independently complete home exercise program with correct form. -MET   Patient will ambulate independently with straight cane for increased independence with community ambulation. -MET   Patient will complete sit to stand transfer with no UE use for increased independence with functional transfers. -MET      Long Term Goals: 6 weeks   Pt will increase R knee flexion to 110, knee extension to 0 degrees, and quad lag to 0 degrees to allow patient normal gait pattern.-MET   Pt will increase R knee strength to 4/5 to allow patient to safely return to prior level of function -Goal in progress  Pt will report decrease in pain to less than or equal to 2/10 to improve quality of life-MET   Pt will ambulate with increased gait speed to greater than or equal to 2.25 feet per second for increased safety with community ambulation. -Met 2.5 feet per second   Pt will negotiate up and down stairs with a reciprocal gait pattern for increased safety with community mobility.-Goal in progress      Plan   Plan of care Certification: 6/18/2024 to 8/2/2024.     Outpatient Physical Therapy 2 times weekly for 6 weeks to include the following interventions: Gait Training, Manual Therapy, Moist Heat/ Ice, Neuromuscular Re-ed, Patient Education, Therapeutic Activities,  and Therapeutic Exercise.     Continue POC per PT orders to progress patient toward rehab goals.      Gabriel Clemente, PT, DPT   7/30/2024

## 2024-08-01 ENCOUNTER — CLINICAL SUPPORT (OUTPATIENT)
Dept: REHABILITATION | Facility: HOSPITAL | Age: 59
End: 2024-08-01
Payer: COMMERCIAL

## 2024-08-01 DIAGNOSIS — M25.561 ACUTE PAIN OF RIGHT KNEE: Primary | ICD-10-CM

## 2024-08-01 PROCEDURE — 97110 THERAPEUTIC EXERCISES: CPT | Mod: KX

## 2024-08-01 PROCEDURE — 97530 THERAPEUTIC ACTIVITIES: CPT

## 2024-08-01 PROCEDURE — 97112 NEUROMUSCULAR REEDUCATION: CPT | Mod: KX

## 2024-08-01 NOTE — PROGRESS NOTES
"[facility]   Physical Therapy Treatment Note      Name: Basilio Francis  Clinic Number: [id number]    Therapy Diagnosis:   Encounter Diagnosis   Name Primary?    Acute pain of right knee Yes       Physician: Malka Lebron PA    Visit Date: 8/1/2024    Physician Orders: PT Eval and Treat   Medical Diagnosis from Referral: R TKA   Evaluation Date: 6/18/2024  Authorization Period Expiration: 6/12/2024  Plan of Care Expiration: 8/16/2024  Progress Note Due: 8/16/2024  Date of Surgery: 5/28/2024  Visit # / Visits authorized: 11/18   FOTO: to be completed on visit 7     Precautions: Standard      Time In: 1415  Time Out: 1302  Total Billable Time: 47 minutes    PTA Visit #: 0/5   Subjective     Patient reports: no pain in the knee, just a little soreness from time to time. Still using single point straight cane for ambulation.      He was compliant with home exercise program.  Response to previous treatment: no adverse effects  Functional change: improved mobility with less pain     Pain: 0/10  Location: right knee      Objective      Objective Measures updated at progress report unless specified.     Treatment     Bautista received the treatments listed below:      Therapeutic exercise total time: 10 minutes. See flowsheet below.     Ther- Ex Performed today Reps    Bike [x]Yes    []No 8 minutes    Wedge [x]Yes    []No 3x30 sec   LAQ's  []Yes    [x]No 20 x 3"    HS curl with swiss ball []Yes    [x]No 20   Quad sets  []Yes    [x]No 3 x 10   Straight Leg Raises, [place] tap overs, with 2-lb cuff weight [x]Yes    []No 2 x 10   Heel slides with ankle pump []Yes    [x]No 30  x (110 degrees)    Knee flex stretch   []Yes    [x]No 3x30 sec   Hamstring Stretch  []Yes    [x]No 3x30 sec      Neuromuscular reeducation total time: 13 minutes. See flowsheet below.     Neuro re-ed activity Performed today Reps/Sets/Hold time   SAQ [x]Yes    []No 20 x 3 sec hold, 3 lb cuff weight   Long bridge on ball []Yes    " [x]No 20   Prone QS []Yes    [x]No 10 x 10 sec   QS shift [x]Yes    []No 10 x 10 sec   High knee marching in // bars; 3 sec squeeze into TKE on stance leg, heel strike BLE [x]Yes    []No    TKEs standing  []Yes    [x]No 2 x 10 red TB    Balance beam walking [x]Yes    []No 6 lengths of // bars     Manual therapy total time: 0 minutes. See flowsheet below.     Manual therapy technique Performed today   Knee flex with IR at EOB []Yes    [x]No   Knee ext OP with superior patellar slide []Yes    [x]No    []Yes    []No    []Yes    []No    []Yes    []No     Therapeutic activity total time: 24 minutes. See flowsheet below.    Therapeutic activity Performed today Reps/Sets/Hold time   Step downs, lateral; RLE on 4-inch step [x]Yes    []No 2 x 10    Sit to stands, from standard chair; no HHA [x]Yes    []No 3 x 10   Step ups, 6-inch; RLE [x]Yes    []No 2 x 10    Heel raises, off 6-inch step [x]Yes    []No 3 x 10    Step downs, forward; RLE on 4-inch step  []Yes    [x]No 2 x 10    Mini Squats  []Yes    [x]No 2 x 10 with red band    Mini lunges, in // bars; each LE forward  [x]Yes    []No 15 each LE     Patient Education and Home Exercises       Education provided:   - Plan of Care, Home exercise program, Delayed onset muscle soreness     Written Home Exercises Provided: Patient instructed to cont prior HEP. Exercises were reviewed and Bautista was able to demonstrate them prior to the end of the session.  Bautista demonstrated good  understanding of the education provided. See Electronic Medical Record under Patient Instructions for exercises provided during therapy sessions    Assessment     Added some balance/proprioception today for LE stability training with good tolerance on pt's part. Also, emphasized ambulation without AD today and observed pt taking shorter step with LLE compared to RLE and slight trendelenburg with stance on the RLE. Will need to continue with LE strengthening and stability to improve gait mechanics and  improve functional ability.     Patient prognosis is Good.   Patient will benefit from skilled outpatient Physical Therapy to address the deficits stated above and in the chart below, provide patient /family education, and to maximize patientt's level of independence.      Plan of care discussed with patient: Yes  Patient's spiritual, cultural and educational needs considered and patient is agreeable to the plan of care and goals as stated below:      Anticipated Barriers for therapy: compliance with Home exercise program    Goals:  Short Term Goals: 3 weeks   Patient will independently complete home exercise program with correct form. -MET   Patient will ambulate independently with straight cane for increased independence with community ambulation. -MET   Patient will complete sit to stand transfer with no UE use for increased independence with functional transfers. -MET      Long Term Goals: 6 weeks   Pt will increase R knee flexion to 110, knee extension to 0 degrees, and quad lag to 0 degrees to allow patient normal gait pattern.-MET   Pt will increase R knee strength to 4/5 to allow patient to safely return to prior level of function -Goal in progress  Pt will report decrease in pain to less than or equal to 2/10 to improve quality of life-MET   Pt will ambulate with increased gait speed to greater than or equal to 2.25 feet per second for increased safety with community ambulation. -Met 2.5 feet per second   Pt will negotiate up and down stairs with a reciprocal gait pattern for increased safety with community mobility.-Goal in progress      Plan   Plan of care Certification: 6/18/2024 to 8/2/2024.     Outpatient Physical Therapy 2 times weekly for 6 weeks to include the following interventions: Gait Training, Manual Therapy, Moist Heat/ Ice, Neuromuscular Re-ed, Patient Education, Therapeutic Activities, and Therapeutic Exercise.     Continue POC per PT orders to progress patient toward rehab goals.      Gabriel  Bryn, PT, DPT   8/1/2024

## 2024-08-08 ENCOUNTER — CLINICAL SUPPORT (OUTPATIENT)
Dept: REHABILITATION | Facility: HOSPITAL | Age: 59
End: 2024-08-08
Payer: MEDICARE

## 2024-08-08 DIAGNOSIS — M25.561 ACUTE PAIN OF RIGHT KNEE: Primary | ICD-10-CM

## 2024-08-08 PROCEDURE — 97110 THERAPEUTIC EXERCISES: CPT | Mod: CQ

## 2024-08-08 PROCEDURE — 97112 NEUROMUSCULAR REEDUCATION: CPT | Mod: CQ

## 2024-08-08 PROCEDURE — 97530 THERAPEUTIC ACTIVITIES: CPT | Mod: CQ

## 2024-08-13 ENCOUNTER — CLINICAL SUPPORT (OUTPATIENT)
Dept: REHABILITATION | Facility: HOSPITAL | Age: 59
End: 2024-08-13
Payer: MEDICARE

## 2024-08-13 DIAGNOSIS — M25.561 ACUTE PAIN OF RIGHT KNEE: Primary | ICD-10-CM

## 2024-08-13 PROCEDURE — 97530 THERAPEUTIC ACTIVITIES: CPT

## 2024-08-13 PROCEDURE — 97110 THERAPEUTIC EXERCISES: CPT | Mod: KX

## 2024-08-13 PROCEDURE — 97112 NEUROMUSCULAR REEDUCATION: CPT | Mod: KX

## 2024-08-13 NOTE — PROGRESS NOTES
"[facility]   Physical Therapy Treatment Note      Name: Basilio Francis  [facility] Number: [id number]    Therapy Diagnosis:   Encounter Diagnosis   Name Primary?    Acute pain of right knee Yes       Physician: Malka Lebron PA    Visit Date: 8/13/2024    Physician Orders: PT Eval and Treat   Medical Diagnosis from Referral: R TKA   Evaluation Date: 6/18/2024  Authorization Period Expiration: 6/12/2024  Plan of Care Expiration: 8/16/2024  Progress Note Due: 8/16/2024  Date of Surgery: 5/28/2024  Visit # / Visits authorized: 15/18   FOTO: to be completed on visit 7     Precautions: Standard      Time In: 1104  Time Out: 1147  Total Billable Time: 43 minutes     PTA Visit #: 0/5  Subjective     Patient reports: no buckling in knee and only using straight cane when out in crowded areas.     He was compliant with home exercise program.  Response to previous treatment: no adverse effects  Functional change: improved mobility with less pain     Pain: 0/10  Location: right knee      Objective      Objective Measures updated at progress report unless specified.     Treatment     Bautista received the treatments listed below:      Therapeutic exercise total time: 11 minutes. See flowsheet below.     Ther- Ex Performed today Reps    Bike [x]Yes    []No 8 minutes    Wedge [x]Yes    []No 3x30 sec   LAQ's  []Yes    [x]No 20 x 3"    HS curl with swiss ball []Yes    [x]No 20   Quad sets  []Yes    [x]No 3 x 10   Straight Leg Raises, [place] tap overs, with 2-lb cuff weight [x]Yes    []No 2 x 10   Heel slides with ankle pump []Yes    [x]No 30  x (110 degrees)    Knee flex stretch   []Yes    [x]No 3x30 sec   Hamstring Stretch  []Yes    [x]No 3x30 sec   Hip hikes, R foot standing on blue foam pad [x]Yes    []No 2 x 10      Neuromuscular reeducation total time: 8 minutes. See flowsheet below.     Neuro re-ed activity Performed today Reps/Sets/Hold time   SAQ [x]Yes    []No 30 x 3 sec hold, 4 lb cuff weight "   Long bridge on ball []Yes    [x]No 20   Prone QS []Yes    [x]No 10 x 10 sec   QS shift []Yes    [x]No 10 x 10 sec   High knee marching in // bars; 3 sec squeeze into TKE on stance leg, heel strike BLE [x]Yes    []No 6 lengths of // bars   TKEs standing  []Yes    [x]No 2 x 10 red TB    Balance beam walking []Yes    [x]No 6 lengths of // bars     Manual therapy total time: 0 minutes. See flowsheet below.     Manual therapy technique Performed today   Knee flex with IR at EOB []Yes    [x]No   Knee ext OP with superior patellar slide []Yes    [x]No    []Yes    []No    []Yes    []No    []Yes    []No     Therapeutic activity total time: 24 minutes. See flowsheet below.    Therapeutic activity Performed today Reps/Sets/Hold time   Step downs, lateral; RLE on 6-inch step [x]Yes    []No 2 x 10    Sit to stands, from standard chair; no HHA []Yes    [x]No 3 x 10   Step ups, 6-inch; RLE [x]Yes    []No 2 x 10    Heel raises, off 4-inch step [x]Yes    []No 3 x 10    Step downs, forward; RLE on 4-inch step  [x]Yes    []No 2 x 10    Mini Squats  [x]Yes    []No 3 x 10   Mini lunges, in // bars; each LE forward  []Yes    [x]No 15 each LE     Patient Education and Home Exercises       Education provided:   - Plan of Care, Home exercise program, Delayed onset muscle soreness     Written Home Exercises Provided: Patient instructed to cont prior HEP. Exercises were reviewed and Bautista was able to demonstrate them prior to the end of the session.  Bautista demonstrated good  understanding of the education provided. See Electronic Medical Record under Patient Instructions for exercises provided during therapy sessions    Assessment     Continued with exercises/activities from previous treatment session with appropriate fatigue in targeted musculature. He has good knee control during more functional activities such as step ups, step downs, and squats. He is likely nearing end of episode of care with us due to his progress thus far. His POC  ends this week, so will likely DC at that time.     Patient prognosis is Good.   Patient will benefit from skilled outpatient Physical Therapy to address the deficits stated above and in the chart below, provide patient /family education, and to maximize patientt's level of independence.      Plan of care discussed with patient: Yes  Patient's spiritual, cultural and educational needs considered and patient is agreeable to the plan of care and goals as stated below:      Anticipated Barriers for therapy: compliance with Home exercise program    Goals:  Short Term Goals: 3 weeks   Patient will independently complete home exercise program with correct form. -MET   Patient will ambulate independently with straight cane for increased independence with community ambulation. -MET   Patient will complete sit to stand transfer with no UE use for increased independence with functional transfers. -MET      Long Term Goals: 6 weeks   Pt will increase R knee flexion to 110, knee extension to 0 degrees, and quad lag to 0 degrees to allow patient normal gait pattern.-MET   Pt will increase R knee strength to 4/5 to allow patient to safely return to prior level of function -Goal in progress  Pt will report decrease in pain to less than or equal to 2/10 to improve quality of life-MET   Pt will ambulate with increased gait speed to greater than or equal to 2.25 feet per second for increased safety with community ambulation. -Met 2.5 feet per second   Pt will negotiate up and down stairs with a reciprocal gait pattern for increased safety with community mobility.-Goal in progress      Plan   Plan of care Certification: 6/18/2024 to 8/16/2024.     Outpatient Physical Therapy 2 times weekly for 6 weeks to include the following interventions: Gait Training, Manual Therapy, Moist Heat/ Ice, Neuromuscular Re-ed, Patient Education, Therapeutic Activities, and Therapeutic Exercise.     Continue POC per PT orders to progress patient toward  rehab goals.      Gabriel Clemente, PT, DPT     8/13/2024

## 2024-08-16 ENCOUNTER — CLINICAL SUPPORT (OUTPATIENT)
Dept: REHABILITATION | Facility: HOSPITAL | Age: 59
End: 2024-08-16
Payer: MEDICARE

## 2024-08-16 DIAGNOSIS — M25.561 ACUTE PAIN OF RIGHT KNEE: Primary | ICD-10-CM

## 2024-08-16 PROCEDURE — 97110 THERAPEUTIC EXERCISES: CPT

## 2024-08-16 PROCEDURE — 97112 NEUROMUSCULAR REEDUCATION: CPT

## 2024-08-16 PROCEDURE — 97530 THERAPEUTIC ACTIVITIES: CPT

## 2024-08-16 NOTE — PROGRESS NOTES
"[facility]   Physical Therapy Treatment Note      Name: Basilio Francis  [facility] Number: [id number]    Therapy Diagnosis:   No diagnosis found.    Physician: Malka Lebron PA    Visit Date: 8/16/2024    Physician Orders: PT Eval and Treat   Medical Diagnosis from Referral: R TKA   Evaluation Date: 6/18/2024  Authorization Period Expiration: 6/12/2024  Plan of Care Expiration: 8/16/2024  Progress Note Due: 8/16/2024  Date of Surgery: 5/28/2024  Visit # / Visits authorized: 17/18      Precautions: Standard      Time In: 10:48  Time Out: 11:26  Total Billable Time: 38 minutes     PTA Visit #: 0/5  Subjective     Patient reports: no pain of difficulty with ADLs.     He was compliant with home exercise program.  Response to previous treatment: no adverse effects  Functional change: improved mobility with less pain     Pain: 0/10  Location: right knee      Objective      Objective Measures updated at progress report unless specified.     Treatment     Bautista received the treatments listed below:      Therapeutic exercise total time: 12 minutes. See flowsheet below.     Ther- Ex Performed today Reps    Bike [x]Yes    []No 8 minutes    Wedge [x]Yes    []No 3x30 sec   LAQ's  []Yes    [x]No 20 x 3"    HS curl with swiss ball []Yes    [x]No 20   Quad sets  []Yes    [x]No 3 x 10   Straight Leg Raises, Reeders tap overs, with 2-lb cuff weight [x]Yes    []No 2 x 10   Sidelying Hip ABD  []Yes    [x]No 2 x 10    Knee flex stretch   []Yes    [x]No 3x30 sec   Hamstring Stretch  []Yes    [x]No 3x30 sec   Hip hikes, R foot standing on blue foam pad []Yes    [x]No 2 x 10      Neuromuscular reeducation total time: 8 minutes. See flowsheet below.     Neuro re-ed activity Performed today Reps/Sets/Hold time   SAQ [x]Yes    []No 30 x 3 sec hold, 4 lb cuff weight   Long bridge on ball []Yes    [x]No 20   Prone QS []Yes    [x]No 10 x 10 sec   QS sets [x]Yes    []No 20 x 3"    High knee marching in // bars; 3 sec " squeeze into TKE on stance leg, heel strike BLE [x]Yes    []No 6 lengths of // bars   TKEs standing  [x]Yes    []No 2 x 10 red TB    Balance beam walking []Yes    [x]No 6 lengths of // bars     Therapeutic activity total time: 18 minutes. See flowsheet below.    Therapeutic activity Performed today Reps/Sets/Hold time   Step downs, lateral; RLE on 6-inch step [x]Yes    []No 2 x 10    Sit to stands, from standard chair; no HHA [x]Yes    []No 3 x 10   Step ups, 6-inch; RLE [x]Yes    []No 2 x 10    Heel raises, off 4-inch step [x]Yes    []No 3 x 10    Step downs, forward; RLE on 4-inch step  [x]Yes    []No 2 x 10    Mini Squats  [x]Yes    []No 3 x 10   Mini lunges, in // bars; each LE forward  []Yes    [x]No 15 each LE     Patient Education and Home Exercises       Education provided:   - Plan of Care, Home exercise program, Delayed onset muscle soreness     Written Home Exercises Provided: Patient instructed to cont prior HEP. Exercises were reviewed and Bautista was able to demonstrate them prior to the end of the session.  Bautista demonstrated good  understanding of the education provided. See Electronic Medical Record under Patient Instructions for exercises provided during therapy sessions    Assessment   PT provided patient with occasional visual, verbal and tactile cues throughout treatment to insure proper understanding for continuation at home with Home exercise program. Patient goal assessment completed for last scheduled visit in PT plan of care. Patient has met all rehab goals at this time and is appropriate to discharge to home exercise program at this time.     Patient prognosis is Good.   Patient will benefit from skilled outpatient Physical Therapy to address the deficits stated above and in the chart below, provide patient /family education, and to maximize patientt's level of independence.      Plan of care discussed with patient: Yes  Patient's spiritual, cultural and educational needs considered and  patient is agreeable to the plan of care and goals as stated below:      Anticipated Barriers for therapy: compliance with Home exercise program    Goals:  Short Term Goals: 3 weeks   Patient will independently complete home exercise program with correct form. -MET   Patient will ambulate independently with straight cane for increased independence with community ambulation. -MET   Patient will complete sit to stand transfer with no UE use for increased independence with functional transfers. -MET      Long Term Goals: 6 weeks   Pt will increase R knee flexion to 110, knee extension to 0 degrees, and quad lag to 0 degrees to allow patient normal gait pattern.-MET   Pt will increase R knee strength to 4/5 to allow patient to safely return to prior level of function -MET   Pt will report decrease in pain to less than or equal to 2/10 to improve quality of life-MET   Pt will ambulate with increased gait speed to greater than or equal to 2.25 feet per second for increased safety with community ambulation. MET   Pt will negotiate up and down stairs with a reciprocal gait pattern for increased safety with community mobility.-Goal in progress -MET     Plan   Discharge to home exercise program.      Chirag May, PT, DPT     8/16/2024

## 2024-10-15 DIAGNOSIS — Z96.651 S/P TKR (TOTAL KNEE REPLACEMENT), RIGHT: Primary | ICD-10-CM

## 2024-10-16 ENCOUNTER — HOSPITAL ENCOUNTER (OUTPATIENT)
Dept: RADIOLOGY | Facility: HOSPITAL | Age: 59
Discharge: HOME | End: 2024-10-16
Attending: ORTHOPAEDIC SURGERY
Payer: MEDICARE

## 2024-10-16 ENCOUNTER — OFFICE VISIT (OUTPATIENT)
Dept: ORTHOPEDICS | Facility: CLINIC | Age: 59
End: 2024-10-16
Payer: MEDICARE

## 2024-10-16 DIAGNOSIS — Z96.651 S/P TKR (TOTAL KNEE REPLACEMENT), RIGHT: ICD-10-CM

## 2024-10-16 DIAGNOSIS — Z96.651 S/P TKR (TOTAL KNEE REPLACEMENT), RIGHT: Primary | ICD-10-CM

## 2024-10-16 PROCEDURE — 1159F MED LIST DOCD IN RCRD: CPT | Mod: CPTII,,, | Performed by: ORTHOPAEDIC SURGERY

## 2024-10-16 PROCEDURE — 73562 X-RAY EXAM OF KNEE 3: CPT | Mod: TC,RT

## 2024-10-16 PROCEDURE — 4010F ACE/ARB THERAPY RXD/TAKEN: CPT | Mod: CPTII,,, | Performed by: ORTHOPAEDIC SURGERY

## 2024-10-16 PROCEDURE — 99214 OFFICE O/P EST MOD 30 MIN: CPT | Mod: S$PBB,,, | Performed by: ORTHOPAEDIC SURGERY

## 2024-10-16 PROCEDURE — 1160F RVW MEDS BY RX/DR IN RCRD: CPT | Mod: CPTII,,, | Performed by: ORTHOPAEDIC SURGERY

## 2024-10-16 PROCEDURE — 3044F HG A1C LEVEL LT 7.0%: CPT | Mod: CPTII,,, | Performed by: ORTHOPAEDIC SURGERY

## 2024-10-16 PROCEDURE — 99213 OFFICE O/P EST LOW 20 MIN: CPT | Mod: PBBFAC,25 | Performed by: ORTHOPAEDIC SURGERY

## 2024-10-16 PROCEDURE — 73562 X-RAY EXAM OF KNEE 3: CPT | Mod: 26,RT,, | Performed by: ORTHOPAEDIC SURGERY

## 2024-10-16 PROCEDURE — 99999 PR PBB SHADOW E&M-EST. PATIENT-LVL III: CPT | Mod: PBBFAC,,, | Performed by: ORTHOPAEDIC SURGERY

## 2024-10-16 NOTE — PROGRESS NOTES
CLINIC NOTE       Chief Complaint   Patient presents with    Right Knee - Pain        Basilio Francis is a 59 y.o. male seen today for recheck of his right knee.  He underwent right total knee replacement arthroplasty 05/28/2024.  Postoperative course has been uncomplicated 1 gradual improvement.  He was ambulating independently in his resumed normal activities of daily living.  He was pleased that he was progress.  He was Mobic available for p.r.n. use with some intermittent left calf pain.  Takes an aspirin daily    Past Medical History:   Diagnosis Date    Essential (primary) hypertension     Gout, arthritis     Mixed hyperlipidemia     Type 2 diabetes mellitus without complication, without long-term current use of insulin 05/22/2024     No family history on file.  Current Outpatient Medications on File Prior to Visit   Medication Sig Dispense Refill    allopurinoL (ZYLOPRIM) 300 MG tablet Take 300 mg by mouth.      atorvastatin (LIPITOR) 10 MG tablet Take 10 mg by mouth.      gemfibroziL (LOPID) 600 MG tablet Take 600 mg by mouth.      losartan (COZAAR) 25 MG tablet Take 25 mg by mouth.      metFORMIN (GLUCOPHAGE) 500 MG tablet Take 500 mg by mouth.       No current facility-administered medications on file prior to visit.       ROS     There were no vitals filed for this visit.    Past Surgical History:   Procedure Laterality Date    ARTHROPLASTY, KNEE, TOTAL, USING COMPUTER-ASSISTED NAVIGATION Right 5/28/2024    Procedure: ARTHROPLASTY, KNEE, TOTAL, USING COMPUTER-ASSISTED NAVIGATION;  Surgeon: David Norris MD;  Location: [facility];  Service: Orthopedics;  Laterality: Right;        Review of patient's allergies indicates:  No Known Allergies     Ortho Exam right knee midline anterior incision well healed without signs of infection.  Knee motion is 0-130 degrees of flexion.  Knee ligaments stable clinically.  Patella tracks well in the midline.    Radiographic Examination:  Right knee  10/16/2024    Technique:  Three views AP lateral and patellar    Findings:  Bones well mineralized.  Metallic Arthrex components are in expected position alignment for TKR.  Patella seen midline sulcus on sunrise view.  Impression:   See Above    Assessment and Plan  Patient Active Problem List    Diagnosis Date Noted    S/P TKR (total knee replacement), right 07/10/2024    Acute pain of right knee 06/18/2024    Essential (primary) hypertension     Mixed hyperlipidemia     Gout, arthritis     Type 2 diabetes mellitus without complication, without long-term current use of insulin 05/22/2024    Class 3 severe obesity with body mass index (BMI) of 45.0 to 49.9 in adult 05/22/2024    Primary osteoarthritis of right knee 05/06/2024    Impression: Status post right TKR doing well  Plan:  Continue protective measures and slow progression of activities.  Recheck 6 months.  X-ray of the right knee or sooner for interval problems.    David Norris M.D.

## 2025-04-15 DIAGNOSIS — Z96.651 HISTORY OF TOTAL RIGHT KNEE REPLACEMENT: Primary | ICD-10-CM

## 2025-04-16 ENCOUNTER — HOSPITAL ENCOUNTER (OUTPATIENT)
Dept: RADIOLOGY | Facility: HOSPITAL | Age: 60
Discharge: HOME | End: 2025-04-16
Attending: ORTHOPAEDIC SURGERY
Payer: MEDICARE

## 2025-04-16 ENCOUNTER — OFFICE VISIT (OUTPATIENT)
Dept: ORTHOPEDICS | Facility: CLINIC | Age: 60
End: 2025-04-16
Payer: MEDICARE

## 2025-04-16 DIAGNOSIS — Z96.651 HISTORY OF TOTAL RIGHT KNEE REPLACEMENT: ICD-10-CM

## 2025-04-16 DIAGNOSIS — Z96.651 S/P TKR (TOTAL KNEE REPLACEMENT), RIGHT: Primary | ICD-10-CM

## 2025-04-16 PROCEDURE — 73562 X-RAY EXAM OF KNEE 3: CPT | Mod: TC,RT

## 2025-04-16 PROCEDURE — 99214 OFFICE O/P EST MOD 30 MIN: CPT | Mod: S$PBB,,, | Performed by: ORTHOPAEDIC SURGERY

## 2025-04-16 PROCEDURE — 4010F ACE/ARB THERAPY RXD/TAKEN: CPT | Mod: CPTII,,, | Performed by: ORTHOPAEDIC SURGERY

## 2025-04-16 PROCEDURE — 1160F RVW MEDS BY RX/DR IN RCRD: CPT | Mod: CPTII,,, | Performed by: ORTHOPAEDIC SURGERY

## 2025-04-16 PROCEDURE — 1159F MED LIST DOCD IN RCRD: CPT | Mod: CPTII,,, | Performed by: ORTHOPAEDIC SURGERY

## 2025-04-16 PROCEDURE — 99213 OFFICE O/P EST LOW 20 MIN: CPT | Mod: PBBFAC,25 | Performed by: ORTHOPAEDIC SURGERY

## 2025-04-16 PROCEDURE — 99999 PR PBB SHADOW E&M-EST. PATIENT-LVL III: CPT | Mod: PBBFAC,,, | Performed by: ORTHOPAEDIC SURGERY

## 2025-04-16 NOTE — PROGRESS NOTES
CLINIC NOTE       Chief Complaint   Patient presents with    Right Knee - Follow-up        Basilio Francis is a 60 y.o. male seen today for recheck of his right knee.  He is now nearly 11 months following right total knee replacement arthroplasty.  She is doing very well at this time.  He has had excellent relief of preoperative pain symptoms.  He has resumed normal activities of daily living as desired.  He has some DJD of his left knee with moderate symptoms presently..      Past Medical History:   Diagnosis Date    Essential (primary) hypertension     Gout, arthritis     Mixed hyperlipidemia     Type 2 diabetes mellitus without complication, without long-term current use of insulin 05/22/2024     No family history on file.  Medications Ordered Prior to Encounter[1]    ROS     There were no vitals filed for this visit.    Past Surgical History:   Procedure Laterality Date    ARTHROPLASTY, KNEE, TOTAL, USING COMPUTER-ASSISTED NAVIGATION Right 5/28/2024    Procedure: ARTHROPLASTY, KNEE, TOTAL, USING COMPUTER-ASSISTED NAVIGATION;  Surgeon: David Norris MD;  Location: [facility];  Service: Orthopedics;  Laterality: Right;        Review of patient's allergies indicates:  No Known Allergies     Ortho Exam : Right knee shows well-healed midline anterior incision.  No effusion.  Knee range motion is 0-130 degrees of flexion.  Knee ligaments stable clinically.  Patella tracks well in the midline.    Radiographic Examination:  Right knee 04/16/2025    Technique:  Three views AP lateral and patellar    Findings:  Bones well mineralized.  Metallic Arthrex components are in expected position alignment for right TKR.  Patella seen midline sulcus on sunrise view.    Impression:   See Above    Assessment and Plan  Patient Active Problem List    Diagnosis Date Noted    S/P TKR (total knee replacement), right 07/10/2024    Acute pain of right knee 06/18/2024    Essential (primary) hypertension     Mixed  hyperlipidemia     Gout, arthritis     Type 2 diabetes mellitus without complication, without long-term current use of insulin 05/22/2024    Class 3 severe obesity with body mass index (BMI) of 45.0 to 49.9 in adult 05/22/2024    Primary osteoarthritis of right knee 05/06/2024    Impression:  He is status post right TKR doing well   Plan:  Continue to pursue desired activities of daily living with precautionary measures applied.  Glad to see back at any time for his left knee if he wants to temporize with a corticosteroid injection.  Recheck right knee 1 year's time.  X-ray or sooner for interval problems.      David Norris M.D.                   [1]   Current Outpatient Medications on File Prior to Visit   Medication Sig Dispense Refill    allopurinoL (ZYLOPRIM) 300 MG tablet Take 300 mg by mouth.      atorvastatin (LIPITOR) 10 MG tablet Take 10 mg by mouth.      gemfibroziL (LOPID) 600 MG tablet Take 600 mg by mouth.      losartan (COZAAR) 25 MG tablet Take 25 mg by mouth.      metFORMIN (GLUCOPHAGE) 500 MG tablet Take 500 mg by mouth.       No current facility-administered medications on file prior to visit.

## (undated) DEVICE — PAD CAST SPECIALIST STRL 3

## (undated) DEVICE — KIT TOTAL KNEE RUSH

## (undated) DEVICE — SPONGE LAP 18X18 PREWASHED

## (undated) DEVICE — CUTTER PATELLA DISP 46MM

## (undated) DEVICE — GUIDESCREW 6 KANT HEXAGONAL 3.20MM
Type: IMPLANTABLE DEVICE | Site: KNEE | Status: NON-FUNCTIONAL
Removed: 2024-05-28

## (undated) DEVICE — BANDAGE SURE-WRAP 6INX5YD

## (undated) DEVICE — SPACESUIT TOGA T5 ZIPPER PEEL

## (undated) DEVICE — DRAPE INCISE IOBAN 2 13X13IN

## (undated) DEVICE — TOURNIQUET SB QC SP 44X4IN

## (undated) DEVICE — SOL NACL IRR 1000ML BTL

## (undated) DEVICE — SUT VICRYL 2-0 36 CT-1

## (undated) DEVICE — DRESSING AQUACEL FOAM RECT 6X6

## (undated) DEVICE — CARD UNIV KNEE NAVGTN SW-SCL L

## (undated) DEVICE — BOWL MIXING BONE CEMENT

## (undated) DEVICE — GUIDEPIN 3.20MM 4 KANT SQUARE
Type: IMPLANTABLE DEVICE | Site: KNEE | Status: NON-FUNCTIONAL
Removed: 2024-05-28

## (undated) DEVICE — APPLICATOR CHLORAPREP ORN 26ML

## (undated) DEVICE — GLOVE SENSICARE PI SURG 7.5

## (undated) DEVICE — BLADE PERFORMANCE SAG 21X90MM

## (undated) DEVICE — KIT EVACUATOR 3 SPR  DRN 400CC

## (undated) DEVICE — STAPLER SKIN WIDE

## (undated) DEVICE — BATTERY INSTRUMENT

## (undated) DEVICE — SUT VICRYL CTD 1 27IN CP

## (undated) DEVICE — GLOVE SENSICARE PI GRN 8